# Patient Record
Sex: MALE | Race: WHITE | NOT HISPANIC OR LATINO | Employment: OTHER | URBAN - METROPOLITAN AREA
[De-identification: names, ages, dates, MRNs, and addresses within clinical notes are randomized per-mention and may not be internally consistent; named-entity substitution may affect disease eponyms.]

---

## 2017-04-12 ENCOUNTER — TELEPHONE (OUTPATIENT)
Dept: MEDICAL GROUP | Facility: MEDICAL CENTER | Age: 77
End: 2017-04-12

## 2017-04-27 ENCOUNTER — HOSPITAL ENCOUNTER (OUTPATIENT)
Dept: LAB | Facility: MEDICAL CENTER | Age: 77
End: 2017-04-27
Attending: INTERNAL MEDICINE
Payer: MEDICARE

## 2017-04-27 ENCOUNTER — TELEPHONE (OUTPATIENT)
Dept: MEDICAL GROUP | Facility: MEDICAL CENTER | Age: 77
End: 2017-04-27

## 2017-04-27 DIAGNOSIS — R73.01 IFG (IMPAIRED FASTING GLUCOSE): ICD-10-CM

## 2017-04-27 DIAGNOSIS — R97.20 ELEVATED PSA: ICD-10-CM

## 2017-04-27 DIAGNOSIS — E78.1 HYPERTRIGLYCERIDEMIA: Chronic | ICD-10-CM

## 2017-04-27 DIAGNOSIS — Z12.5 ENCOUNTER FOR SCREENING FOR MALIGNANT NEOPLASM OF PROSTATE: ICD-10-CM

## 2017-04-27 DIAGNOSIS — D75.89 MACROCYTOSIS WITHOUT ANEMIA: ICD-10-CM

## 2017-04-27 LAB
ALBUMIN SERPL BCP-MCNC: 4.1 G/DL (ref 3.2–4.9)
ALBUMIN/GLOB SERPL: 1.1 G/DL
ALP SERPL-CCNC: 64 U/L (ref 30–99)
ALT SERPL-CCNC: 16 U/L (ref 2–50)
ANION GAP SERPL CALC-SCNC: 8 MMOL/L (ref 0–11.9)
AST SERPL-CCNC: 20 U/L (ref 12–45)
BASOPHILS # BLD AUTO: 1.9 % (ref 0–1.8)
BASOPHILS # BLD: 0.12 K/UL (ref 0–0.12)
BILIRUB SERPL-MCNC: 0.6 MG/DL (ref 0.1–1.5)
BUN SERPL-MCNC: 24 MG/DL (ref 8–22)
CALCIUM SERPL-MCNC: 9.2 MG/DL (ref 8.5–10.5)
CHLORIDE SERPL-SCNC: 106 MMOL/L (ref 96–112)
CHOLEST SERPL-MCNC: 118 MG/DL (ref 100–199)
CO2 SERPL-SCNC: 22 MMOL/L (ref 20–33)
CREAT SERPL-MCNC: 0.97 MG/DL (ref 0.5–1.4)
CREAT UR-MCNC: 157.9 MG/DL
EOSINOPHIL # BLD AUTO: 0.26 K/UL (ref 0–0.51)
EOSINOPHIL NFR BLD: 4.1 % (ref 0–6.9)
ERYTHROCYTE [DISTWIDTH] IN BLOOD BY AUTOMATED COUNT: 51.4 FL (ref 35.9–50)
EST. AVERAGE GLUCOSE BLD GHB EST-MCNC: 123 MG/DL
GFR SERPL CREATININE-BSD FRML MDRD: >60 ML/MIN/1.73 M 2
GLOBULIN SER CALC-MCNC: 3.7 G/DL (ref 1.9–3.5)
GLUCOSE SERPL-MCNC: 104 MG/DL (ref 65–99)
HBA1C MFR BLD: 5.9 % (ref 0–5.6)
HCT VFR BLD AUTO: 48.2 % (ref 42–52)
HDLC SERPL-MCNC: 37 MG/DL
HGB BLD-MCNC: 15.9 G/DL (ref 14–18)
IMM GRANULOCYTES # BLD AUTO: 0.07 K/UL (ref 0–0.11)
IMM GRANULOCYTES NFR BLD AUTO: 1.1 % (ref 0–0.9)
LDLC SERPL CALC-MCNC: 54 MG/DL
LYMPHOCYTES # BLD AUTO: 1.9 K/UL (ref 1–4.8)
LYMPHOCYTES NFR BLD: 29.7 % (ref 22–41)
MCH RBC QN AUTO: 33.3 PG (ref 27–33)
MCHC RBC AUTO-ENTMCNC: 33 G/DL (ref 33.7–35.3)
MCV RBC AUTO: 101 FL (ref 81.4–97.8)
MICROALBUMIN UR-MCNC: 11.7 MG/DL
MICROALBUMIN/CREAT UR: 74 MG/G (ref 0–30)
MONOCYTES # BLD AUTO: 0.57 K/UL (ref 0–0.85)
MONOCYTES NFR BLD AUTO: 8.9 % (ref 0–13.4)
NEUTROPHILS # BLD AUTO: 3.48 K/UL (ref 1.82–7.42)
NEUTROPHILS NFR BLD: 54.3 % (ref 44–72)
NRBC # BLD AUTO: 0 K/UL
NRBC BLD AUTO-RTO: 0 /100 WBC
PLATELET # BLD AUTO: 368 K/UL (ref 164–446)
PMV BLD AUTO: 8.5 FL (ref 9–12.9)
POTASSIUM SERPL-SCNC: 4.4 MMOL/L (ref 3.6–5.5)
PROT SERPL-MCNC: 7.8 G/DL (ref 6–8.2)
RBC # BLD AUTO: 4.77 M/UL (ref 4.7–6.1)
SODIUM SERPL-SCNC: 136 MMOL/L (ref 135–145)
TRIGL SERPL-MCNC: 136 MG/DL (ref 0–149)
WBC # BLD AUTO: 6.4 K/UL (ref 4.8–10.8)

## 2017-04-27 PROCEDURE — 82570 ASSAY OF URINE CREATININE: CPT

## 2017-04-27 PROCEDURE — 85025 COMPLETE CBC W/AUTO DIFF WBC: CPT

## 2017-04-27 PROCEDURE — 80053 COMPREHEN METABOLIC PANEL: CPT

## 2017-04-27 PROCEDURE — 36415 COLL VENOUS BLD VENIPUNCTURE: CPT

## 2017-04-27 PROCEDURE — 83036 HEMOGLOBIN GLYCOSYLATED A1C: CPT | Mod: GA

## 2017-04-27 PROCEDURE — 82043 UR ALBUMIN QUANTITATIVE: CPT

## 2017-04-27 PROCEDURE — 84153 ASSAY OF PSA TOTAL: CPT

## 2017-04-27 PROCEDURE — 80061 LIPID PANEL: CPT

## 2017-04-28 LAB — PSA SERPL-MCNC: 3.05 NG/ML (ref 0–4)

## 2017-05-01 ENCOUNTER — TELEPHONE (OUTPATIENT)
Dept: MEDICAL GROUP | Facility: MEDICAL CENTER | Age: 77
End: 2017-05-01

## 2017-05-01 NOTE — TELEPHONE ENCOUNTER
----- Message from Ken Jordan M.D. sent at 4/30/2017  8:00 PM PDT -----  Please, notify the patient that labs are reviewed, and will be discussed at OV, thanks, Dr Steiner

## 2017-05-08 ENCOUNTER — OFFICE VISIT (OUTPATIENT)
Dept: MEDICAL GROUP | Facility: MEDICAL CENTER | Age: 77
End: 2017-05-08
Payer: MEDICARE

## 2017-05-08 VITALS
SYSTOLIC BLOOD PRESSURE: 118 MMHG | OXYGEN SATURATION: 93 % | DIASTOLIC BLOOD PRESSURE: 70 MMHG | BODY MASS INDEX: 29.01 KG/M2 | HEART RATE: 67 BPM | RESPIRATION RATE: 14 BRPM | WEIGHT: 202.6 LBS | TEMPERATURE: 98.8 F | HEIGHT: 70 IN

## 2017-05-08 DIAGNOSIS — N40.1 BENIGN NODULAR PROSTATIC HYPERPLASIA WITH LOWER URINARY TRACT SYMPTOMS: ICD-10-CM

## 2017-05-08 DIAGNOSIS — E78.1 HYPERTRIGLYCERIDEMIA: Chronic | ICD-10-CM

## 2017-05-08 DIAGNOSIS — Z00.00 HEALTH CARE MAINTENANCE: ICD-10-CM

## 2017-05-08 DIAGNOSIS — I10 ESSENTIAL HYPERTENSION: ICD-10-CM

## 2017-05-08 DIAGNOSIS — G60.8 PERIPHERAL SENSORY-MOTOR AXONAL POLYNEUROPATHY: ICD-10-CM

## 2017-05-08 DIAGNOSIS — R73.01 IFG (IMPAIRED FASTING GLUCOSE): ICD-10-CM

## 2017-05-08 DIAGNOSIS — D75.89 MACROCYTOSIS WITHOUT ANEMIA: ICD-10-CM

## 2017-05-08 PROCEDURE — 99214 OFFICE O/P EST MOD 30 MIN: CPT | Performed by: INTERNAL MEDICINE

## 2017-05-08 RX ORDER — TAMSULOSIN HYDROCHLORIDE 0.4 MG/1
0.4 CAPSULE ORAL
Qty: 90 CAP | Refills: 0 | Status: SHIPPED | OUTPATIENT
Start: 2017-05-08 | End: 2017-07-10 | Stop reason: SDUPTHER

## 2017-05-08 RX ORDER — VERAPAMIL HYDROCHLORIDE 240 MG/1
240 TABLET, FILM COATED, EXTENDED RELEASE ORAL
Qty: 180 TAB | Refills: 3 | Status: SHIPPED | OUTPATIENT
Start: 2017-05-08

## 2017-05-08 NOTE — PROGRESS NOTES
CHIEF COMPLAINT  Chief Complaint   Patient presents with   • Follow-Up   HTN    HPI  Patient is a 76 y.o. male patient who presents today for the following     Hypertension, controlled  Meds: Verapamil, 240 mg QD, taking medication as prescribed.    He measuring BP at home, it has been < 125/85.  No headaches, vision problems, tinnitus.  No chest pain/pressure, palpitations, irregular heart beats, exertional dyspnea, peripheral edema.  Diet: regular, low carb  Low salt diet: no  Exercise: > 4 days a week; fitness / gym  BMI: 29  FH: neg    IFG, stable  He had elevated A1c of 5.9, previous 6.0.  Diet: lower carb diet.    Exercise: regular  BMI: 29  No polydipsia, polyphagia, polyuria.  No abdominal pain, weight loss, fatigue.  FH of DM: neg    Hypertriglyceridemia, controlled  Meds:  Gemfibrozil, 600 mg QD              - taking daily, as prescribed. No myalgias, muscle cramps or pain.    Diet / Exercise:  As above  BMI: Body mass index is 29 kg/(m^2).  FH:  Parents  Reviewed FLP, and below.    Macrocytosis / St post colectomy  Stable.  The patient had abnormal CBC, with elevated MCV,  improved.  He has been on oral Vitamin B12 and folate.      Peripheral neuropathy   Onset: ~ in 2009.    Complains of: dysesthesias and paresthesias of the feet  Course: Slowly, gradually worsening  He has been on vitamin B12 and folate supplements.  He was evaluated by neurology, Dr Dacosta, the last office visit falls on 2/10/16, the note was reviewed:  - Continue current treatment.    BPH  C/o  -  urinary urgency.  - nocturia: x 2  - dribbling  - low stream    Denies:   - incomplete emptying  - hesitancy    Meds; he was on finasteride, that worsened his frequency; improved when he d/c med.     Reviewed PMH, PSH, FH, SH, ALL, HCM/IMM, no changes  Reviewed MEDS, no changes    Patient Active Problem List    Diagnosis Date Noted   • Peripheral sensory-motor axonal polyneuropathy      Priority: High   • Decreased hearing of both ears  11/18/2014     Priority: Medium   • Macrocytosis without anemia 11/20/2012     Priority: Medium   • Hypertriglyceridemia 11/15/2011     Priority: Medium   • Status post colectomy 11/15/2011     Priority: Medium   • Elevated PSA 11/15/2011     Priority: Medium   • IFG (impaired fasting glucose) 11/14/2016   • Arcus senilis of both eyes 11/14/2016   • Uric acid nephrolithiasis 11/14/2016   • Health care maintenance 10/08/2015   • Essential hypertension 08/26/2015   • BPH (benign prostatic hyperplasia) 05/20/2015     CURRENT MEDICATIONS  Current Outpatient Prescriptions   Medication Sig Dispense Refill   • verapamil ER (CALAN-SR) 240 MG Tab CR Take 1 Tab by mouth every day. TAKE 1 TAB BY MOUTH EVERY DAY. 180 Tab 1   • allopurinol (ZYLOPRIM) 300 MG Tab Take 1 Tab by mouth every day. 90 Tab 1   • finasteride (PROSCAR) 5 MG Tab Take 1 Tab by mouth every day. TAKE 1 TAB BY MOUTH EVERY DAY. 90 Tab 1   • folic acid (FOLVITE) 1 MG Tab Take 1 Tab by mouth every day. TAKE 1 TAB BY MOUTH EVERY DAY. 90 Tab 1   • gemfibrozil (LOPID) 600 MG Tab Take 1 Tab by mouth every day. TAKE 1 TAB BY MOUTH EVERY DAY. 90 Tab 3   • cyanocobalamin (VITAMIN B12) 1000 MCG Tab Take 1 Tab by mouth every day. 90 Tab 3   • fluticasone (FLONASE) 50 MCG/ACT nasal spray Spray 1 Spray in nose every 12 hours. Each Nostril 3 Bottle 6   • Hydrocortisone Acetate (ANUSOL HC-1) 1 % OINT Apply daily 1 Tube 3   • aspirin 81 MG tablet Take 81 mg by mouth every day.       No current facility-administered medications for this visit.     ALLERGIES  Allergies: Review of patient's allergies indicates no known allergies.  PAST MEDICAL HISTORY  Past Medical History   Diagnosis Date   • Hypertension    • Hyperlipidemia    • Unspecified cataract    • Other inflammatory and toxic neuropathy    • Uric acid nephrolithiasis 2011   • Ulcerative colitis (CMS-HCC)      colectomy in 1990     SURGICAL HISTORY  He  has past surgical history that includes colon resection (1990) and  "abdominal exploration.  SOCIAL HISTORY  Social History   Substance Use Topics   • Smoking status: Never Smoker    • Smokeless tobacco: Never Used   • Alcohol Use: 0.0 oz/week     0 Standard drinks or equivalent per week      Comment: occ     Social History     Social History Narrative     FAMILY HISTORY  Family History   Problem Relation Age of Onset   • Cancer Sister    • Hyperlipidemia Sister    • Hyperlipidemia Mother    • Hyperlipidemia Father    • Hypertension Neg Hx    • Heart Disease Neg Hx    • Diabetes Neg Hx    • Stroke Neg Hx      Family Status   Relation Status Death Age   • Sister     • Mother     • Father       ROS   Constitutional: Negative for fever, chills.  HENT: Negative for congestion, sore throat.  Eyes: Negative for blurred vision.   Respiratory: Negative for cough, shortness of breath.  Cardiovascular: Negative for chest pain, palpitations. And per HPI.  Gastrointestinal: Negative for heartburn, nausea, abdominal pain.   Genitourinary: Negative for dysuria. And per HPI.  Musculoskeletal: Negative for significant myalgias, back pain and joint pain.   Skin: Negative for rash and itching.   Neuro: Negative for dizziness, weakness and headaches. And per HPI.  Endo/Heme/Allergies: Does not bruise/bleed easily. And per HPI.  Psychiatric/Behavioral: Negative for depression, anxiety    PHYSICAL EXAM   Blood pressure 118/70, pulse 67, temperature 37.1 °C (98.7 °F), height 1.778 m (5' 10\"), weight 91.9 kg (202 lb 9.6 oz), SpO2 93 %. Body mass index is 29.07 kg/(m^2).  General:  NAD, well appearing  HEENT:   NC/AT, PERRLA, EOMI, TMs are clear. Oropharyngeal mucosa is pink,  without lesions;  no cervical / supraclavicular  lymphadenopathy, no thyromegaly.    Cardiovascular: RRR.   No m/r/g. No carotid bruits .      Lungs:   CTAB, no w/r/r, no respiratory distress.  Abdomen: Soft, NT/ND + BS; no suprapubic tenderness; no masses or hepatosplenomegaly.  Extremities:  2+ DP and radial " pulses bilaterally.  No c/c/e.   Skin:  Warm, dry.  No erythema. No rash.   Neurologic: Alert & oriented x 3. CN II-XII grossly intact. Brachioradialis / knee DTR are 2/4, symmetric. Strength and sensation grossly intact.  No focal deficits.  Psychiatric:  Affect normal, mood normal, judgment normal.    LABS     Labs are reviewed and discussed with a patient    Lab Results   Component Value Date/Time    CHOLESTEROL, 04/27/2017 09:03 AM    LDL 54 04/27/2017 09:03 AM    HDL 37* 04/27/2017 09:03 AM    TRIGLYCERIDES 136 04/27/2017 09:03 AM       Lab Results   Component Value Date/Time    SODIUM 136 04/27/2017 09:03 AM    POTASSIUM 4.4 04/27/2017 09:03 AM    CHLORIDE 106 04/27/2017 09:03 AM    CO2 22 04/27/2017 09:03 AM    GLUCOSE 104* 04/27/2017 09:03 AM    BUN 24* 04/27/2017 09:03 AM    CREATININE 0.97 04/27/2017 09:03 AM     Lab Results   Component Value Date/Time    ALKALINE PHOSPHATASE 64 04/27/2017 09:03 AM    AST(SGOT) 20 04/27/2017 09:03 AM    ALT(SGPT) 16 04/27/2017 09:03 AM    TOTAL BILIRUBIN 0.6 04/27/2017 09:03 AM      Lab Results   Component Value Date/Time    GLYCOHEMOGLOBIN 5.9* 04/27/2017 09:03 AM    GLYCOHEMOGLOBIN 6.0* 11/02/2016 08:51 AM    GLYCOHEMOGLOBIN 5.8* 01/07/2015 09:26 AM     No results found for: TSH  No results found for: FREET4    Lab Results   Component Value Date/Time    WBC 6.4 04/27/2017 09:03 AM    RBC 4.77 04/27/2017 09:03 AM    HEMOGLOBIN 15.9 04/27/2017 09:03 AM    HEMATOCRIT 48.2 04/27/2017 09:03 AM    .0* 04/27/2017 09:03 AM    MCH 33.3* 04/27/2017 09:03 AM    MCHC 33.0* 04/27/2017 09:03 AM    MPV 8.5* 04/27/2017 09:03 AM    NEUTROPHILS-POLYS 54.30 04/27/2017 09:03 AM    LYMPHOCYTES 29.70 04/27/2017 09:03 AM    MONOCYTES 8.90 04/27/2017 09:03 AM    EOSINOPHILS 4.10 04/27/2017 09:03 AM    BASOPHILS 1.90* 04/27/2017 09:03 AM      IMAGING     None    ASSESMENT AND PLAN        1. Essential hypertension  Controlled, continue current treatment  - verapamil ER (CALAN-SR)  240 MG Tab CR; Take 1 Tab by mouth every day. TAKE 1 TAB BY MOUTH EVERY DAY.  Dispense: 180 Tab; Refill: 3  - BASIC METABOLIC PANEL; Future    2. IFG (impaired fasting glucose)  Stable, controlled with lifestyle modification;   - Continue low fat/low carb diet, daily exercise  - HEMOGLOBIN A1C; Future  - BASIC METABOLIC PANEL; Future    3. Hypertriglyceridemia  Controlled, continue current dose of gemfibrozil    4. Macrocytosis without anemia  Stable, continue vitamin D supplement    5. Peripheral sensory-motor axonal polyneuropathy  Stable, no medications, will be followed    6. Benign nodular prostatic hyperplasia with lower urinary tract symptoms  Trial:  - tamsulosin (FLOMAX) 0.4 MG capsule; Take 1 Cap by mouth ONE-HALF HOUR AFTER BREAKFAST.  Dispense: 90 Cap; Refill: 0    7. Health care maintenance  Advised shingles shot.    Counseling:   - Smoking:  Nonsmoker    Followup: in 4 weeks - SCP/PE    All questions are answered.    Please note that this dictation was created using voice recognition software, and that there might be errors of oli and possibly content.

## 2017-05-08 NOTE — MR AVS SNAPSHOT
"        [place]   2017 11:40 AM   Office Visit   MRN: [id number]    Department:  [facility]   Dept Phone:  [phone]    Description:  Male : 1940   Provider:  Ken Jordan M.D.           Reason for Visit     Follow-Up           Allergies as of 2017     No Known Allergies      You were diagnosed with     Essential hypertension   [7202371]       IFG (impaired fasting glucose)   [177224]       Hypertriglyceridemia   [829232]       Macrocytosis without anemia   [422723]       Peripheral sensory-motor axonal polyneuropathy   [890177]       Benign nodular prostatic hyperplasia with lower urinary tract symptoms   [7540024]       Health care maintenance   [593608]         Vital Signs     Blood Pressure Pulse Temperature Respirations Height Weight    118/70 mmHg 67 37.1 °C (98.8 °F) 14 1.778 m (5' 10\") 91.9 kg (202 lb 9.6 oz)    Body Mass Index Oxygen Saturation Smoking Status             29.07 kg/m2 93% Never Smoker          Basic Information     Date Of Birth Sex Race Ethnicity Preferred Language    1940 Male White Unknown English      Your appointments     2017  9:00 AM   ANNUAL WELLNESS with Ken Jordan M.D., [place]    [facility]    [address]  [address]  [facility] [address]   [phone]              Problem List              ICD-10-CM Priority Class Noted - Resolved    Hypertriglyceridemia (Chronic) E78.1 Medium  11/15/2011 - Present    Status post colectomy (Chronic) Z90.49 Medium  11/15/2011 - Present    Macrocytosis without anemia D75.89 Medium  2012 - Present    Decreased hearing of both ears H91.93 Medium  2014 - Present    Peripheral sensory-motor axonal polyneuropathy G60.8 High  Unknown - Present    BPH (benign prostatic hyperplasia) N40.0   2015 - Present    Essential hypertension I10   2015 - Present    Health care maintenance Z00.00   10/8/2015 - " Present    IFG (impaired fasting glucose) R73.01   11/14/2016 - Present    Arcus senilis of both eyes H18.413   11/14/2016 - Present    Uric acid nephrolithiasis N20.0   11/14/2016 - Present      Health Maintenance        Date Due Completion Dates    IMM ZOSTER VACCINE 6/15/1999 ---    IMM DTaP/Tdap/Td Vaccine (2 - Td) 5/14/2023 5/14/2013            Current Immunizations     13-VALENT PCV PREVNAR 11/14/2016 12:06 PM    INFLUENZA VACCINE H1N1 2/9/2010    Influenza TIV (IM) 11/20/2012  9:44 AM    Influenza Vaccine Adult HD 11/23/2016, 10/8/2015, 11/18/2014, 11/12/2013    Pneumococcal polysaccharide vaccine (PPSV-23) 11/12/2013    Tdap Vaccine 5/14/2013      Below and/or attached are the medications your provider expects you to take. Review all of your home medications and newly ordered medications with your provider and/or pharmacist. Follow medication instructions as directed by your provider and/or pharmacist. Please keep your medication list with you and share with your provider. Update the information when medications are discontinued, doses are changed, or new medications (including over-the-counter products) are added; and carry medication information at all times in the event of emergency situations     Allergies:  No Known Allergies          Medications  Valid as of: May 08, 2017 - 11:57 AM    Generic Name Brand Name Tablet Size Instructions for use    Allopurinol (Tab) ZYLOPRIM 300 MG Take 1 Tab by mouth every day.        Aspirin (Tab) aspirin 81 MG Take 81 mg by mouth every day.        Cyanocobalamin (Tab) VITAMIN B12 1000 MCG Take 1 Tab by mouth every day.        Fluticasone Propionate (Suspension) FLONASE 50 MCG/ACT Spray 1 Spray in nose every 12 hours. Each Nostril        Folic Acid (Tab) FOLVITE 1 MG Take 1 Tab by mouth every day. TAKE 1 TAB BY MOUTH EVERY DAY.        Gemfibrozil (Tab) LOPID 600 MG Take 1 Tab by mouth every day. TAKE 1 TAB BY MOUTH EVERY DAY.        Hydrocortisone Acetate (Ointment)  Hydrocortisone Acetate 1 % Apply daily        Tamsulosin HCl (Cap) FLOMAX 0.4 MG Take 1 Cap by mouth ONE-HALF HOUR AFTER BREAKFAST.        Verapamil HCl (Tab CR) CALAN- MG Take 1 Tab by mouth every day. TAKE 1 TAB BY MOUTH EVERY DAY.        .                 Medicines prescribed today were sent to:     [id number] - [place], [place] - [address]    [address] [place] [address]    Phone: [phone] Fax: [phone]    Open 24 Hours?: No      Medication refill instructions:       If your prescription bottle indicates you have medication refills left, it is not necessary to call your provider’s office. Please contact your pharmacy and they will refill your medication.    If your prescription bottle indicates you do not have any refills left, you may request refills at any time through one of the following ways: The online DSTLD system (except Urgent Care), by calling your provider’s office, or by asking your pharmacy to contact your provider’s office with a refill request. Medication refills are processed only during regular business hours and may not be available until the next business day. Your provider may request additional information or to have a follow-up visit with you prior to refilling your medication.   *Please Note: Medication refills are assigned a new Rx number when refilled electronically. Your pharmacy may indicate that no refills were authorized even though a new prescription for the same medication is available at the pharmacy. Please request the medicine by name with the pharmacy before contacting your provider for a refill.        Your To Do List     Future Labs/Procedures Complete By Expires    BASIC METABOLIC PANEL  As directed 5/9/2018    HEMOGLOBIN A1C  As directed 5/9/2018         DSTLD Access Code: [email or web address]  Expires: 6/7/2017 11:57 AM    DSTLD  A secure, online tool to manage your health information     [email or web address]’s DSTLD® is a secure, online tool  that connects you to your personalized health information from the privacy of your home -- day or night - making it very easy for you to manage your healthcare. Once the activation process is completed, you can even access your medical information using the LawBite carlotta, which is available for free in the Apple Carlotta store or Google Play store.     LawBite provides the following levels of access (as shown below):   My Chart Features   [facility] Doctor [facility]  Specialists [facility]  Urgent  Care Non-[facility]  Primary Care  Doctor   Email your healthcare team securely and privately 24/7 X X X    Manage appointments: schedule your next appointment; view details of past/upcoming appointments X      Request prescription refills. X      View recent personal medical records, including lab and immunizations X X X X   View health record, including health history, allergies, medications X X X X   Read reports about your outpatient visits, procedures, consult and ER notes X X X X   See your discharge summary, which is a recap of your hospital and/or ER visit that includes your diagnosis, lab results, and care plan. X X       How to register for LawBite:  1. Go to  [email or web address].  2. Click on the Sign Up Now box, which takes you to the New Member Sign Up page. You will need to provide the following information:  a. Enter your LawBite Access Code exactly as it appears at the top of this page. (You will not need to use this code after you’ve completed the sign-up process. If you do not sign up before the expiration date, you must request a new code.)   b. Enter your date of birth.   c. Enter your home email address.   d. Click Submit, and follow the next screen’s instructions.  3. Create a LawBite ID. This will be your LawBite login ID and cannot be changed, so think of one that is secure and easy to remember.  4. Create a LawBite password. You can change your password at any time.  5. Enter your Password Reset  Question and Answer. This can be used at a later time if you forget your password.   6. Enter your e-mail address. This allows you to receive e-mail notifications when new information is available in Triposo.  7. Click Sign Up. You can now view your health information.    For assistance activating your Triposo account, call [phone]

## 2017-05-18 RX ORDER — FOLIC ACID 1 MG/1
TABLET ORAL
Qty: 90 TAB | Refills: 0 | Status: SHIPPED | OUTPATIENT
Start: 2017-05-18 | End: 2017-08-18 | Stop reason: SDUPTHER

## 2017-05-18 NOTE — TELEPHONE ENCOUNTER
Was the patient seen in the last year in this department? Yes     Does patient have an active prescription for medications requested? No     Received Request Via: Pharmacy     Last Visit: 5/8/17    Last Labs:4/27/17

## 2017-05-22 RX ORDER — ALLOPURINOL 300 MG/1
TABLET ORAL
Qty: 90 TAB | Refills: 1 | Status: SHIPPED | OUTPATIENT
Start: 2017-05-22 | End: 2017-11-18 | Stop reason: SDUPTHER

## 2017-06-01 ENCOUNTER — TELEPHONE (OUTPATIENT)
Dept: MEDICAL GROUP | Facility: MEDICAL CENTER | Age: 77
End: 2017-06-01

## 2017-06-01 NOTE — TELEPHONE ENCOUNTER
ANNUAL WELLNESS VISIT PRE-VISIT PLANNING     1.  Reviewed last PCP office visit assessment and plan notes: Yes 05/08/2017    2.  If any orders were placed last visit do we have Results/Consult Notes?        •  Labs? No, labs still pending        •  Imaging? No        •  Referrals? No     3.  Patient Care Coordination Note was updated with diagnosis information:  No    4.  Patient is due for these Health Maintenance Topics:   Health Maintenance Due   Topic Date Due   • IMM ZOSTER VACCINE  06/15/1999         5.  Immunizations were updated in Send Word Now using WebIZ?: Yes       •  Is patient due for Shingles? Yes.  If yes, was patient alerted of copay? Yes    6.  Patient has:       No chronic disease     7.  Updated Care Team with [facility] and all specialists?        •   Gait devices, O2, CPAP, etc: N\A        •   Eye professional: N\A       •   Other specialists (GYN, cardiology, endo, etc): N\A    8.  Is patient in need of any refills prior to office visit? No       •    Separate refill encounter created?: no    9.  Patient was informed to arrive 15 min prior to their scheduled appointment and bring in their medication bottles? yes    10.  Patient was advised: “This is a free wellness visit. The provider will screen for medical conditions to help you stay healthy. If you have other concerns to address you may be asked to discuss these at a separate visit or there may be an additional fee.”  Yes

## 2017-06-02 ENCOUNTER — HOSPITAL ENCOUNTER (OUTPATIENT)
Dept: LAB | Facility: MEDICAL CENTER | Age: 77
End: 2017-06-02
Attending: INTERNAL MEDICINE
Payer: MEDICARE

## 2017-06-02 DIAGNOSIS — R73.01 IFG (IMPAIRED FASTING GLUCOSE): ICD-10-CM

## 2017-06-02 DIAGNOSIS — I10 ESSENTIAL HYPERTENSION: ICD-10-CM

## 2017-06-02 LAB
ANION GAP SERPL CALC-SCNC: 10 MMOL/L (ref 0–11.9)
BUN SERPL-MCNC: 25 MG/DL (ref 8–22)
CALCIUM SERPL-MCNC: 9.3 MG/DL (ref 8.5–10.5)
CHLORIDE SERPL-SCNC: 106 MMOL/L (ref 96–112)
CO2 SERPL-SCNC: 22 MMOL/L (ref 20–33)
CREAT SERPL-MCNC: 1.11 MG/DL (ref 0.5–1.4)
EST. AVERAGE GLUCOSE BLD GHB EST-MCNC: 128 MG/DL
GFR SERPL CREATININE-BSD FRML MDRD: >60 ML/MIN/1.73 M 2
GLUCOSE SERPL-MCNC: 111 MG/DL (ref 65–99)
HBA1C MFR BLD: 6.1 % (ref 0–5.6)
POTASSIUM SERPL-SCNC: 4.4 MMOL/L (ref 3.6–5.5)
SODIUM SERPL-SCNC: 138 MMOL/L (ref 135–145)

## 2017-06-02 PROCEDURE — 80048 BASIC METABOLIC PNL TOTAL CA: CPT

## 2017-06-02 PROCEDURE — 83036 HEMOGLOBIN GLYCOSYLATED A1C: CPT

## 2017-06-02 PROCEDURE — 36415 COLL VENOUS BLD VENIPUNCTURE: CPT

## 2017-06-08 ENCOUNTER — OFFICE VISIT (OUTPATIENT)
Dept: MEDICAL GROUP | Facility: MEDICAL CENTER | Age: 77
End: 2017-06-08
Payer: MEDICARE

## 2017-06-08 VITALS
BODY MASS INDEX: 29.12 KG/M2 | WEIGHT: 203.4 LBS | DIASTOLIC BLOOD PRESSURE: 66 MMHG | TEMPERATURE: 98.3 F | SYSTOLIC BLOOD PRESSURE: 120 MMHG | OXYGEN SATURATION: 94 % | RESPIRATION RATE: 14 BRPM | HEART RATE: 60 BPM | HEIGHT: 70 IN

## 2017-06-08 DIAGNOSIS — E78.1 HYPERTRIGLYCERIDEMIA: Chronic | ICD-10-CM

## 2017-06-08 DIAGNOSIS — D75.89 MACROCYTOSIS WITHOUT ANEMIA: ICD-10-CM

## 2017-06-08 DIAGNOSIS — Z00.00 HEALTH CARE MAINTENANCE: ICD-10-CM

## 2017-06-08 DIAGNOSIS — H18.413 ARCUS SENILIS, BILATERAL: ICD-10-CM

## 2017-06-08 DIAGNOSIS — R73.01 IFG (IMPAIRED FASTING GLUCOSE): ICD-10-CM

## 2017-06-08 DIAGNOSIS — G60.8 PERIPHERAL SENSORY-MOTOR AXONAL POLYNEUROPATHY: ICD-10-CM

## 2017-06-08 DIAGNOSIS — N40.0 BENIGN NODULAR PROSTATIC HYPERPLASIA WITHOUT LOWER URINARY TRACT SYMPTOMS: ICD-10-CM

## 2017-06-08 DIAGNOSIS — I10 ESSENTIAL HYPERTENSION: ICD-10-CM

## 2017-06-08 DIAGNOSIS — N20.0 URIC ACID NEPHROLITHIASIS: ICD-10-CM

## 2017-06-08 DIAGNOSIS — Z00.00 MEDICARE ANNUAL WELLNESS VISIT, SUBSEQUENT: ICD-10-CM

## 2017-06-08 DIAGNOSIS — Z90.49 STATUS POST TOTAL COLECTOMY: ICD-10-CM

## 2017-06-08 DIAGNOSIS — H91.93 DECREASED HEARING OF BOTH EARS: ICD-10-CM

## 2017-06-08 DIAGNOSIS — L82.1 SEBORRHEIC KERATOSIS: ICD-10-CM

## 2017-06-08 PROBLEM — H18.419 ARCUS SENILIS: Status: ACTIVE | Noted: 2017-06-08

## 2017-06-08 PROCEDURE — G0439 PPPS, SUBSEQ VISIT: HCPCS | Performed by: INTERNAL MEDICINE

## 2017-06-08 ASSESSMENT — PAIN SCALES - GENERAL: PAINLEVEL: NO PAIN

## 2017-06-08 ASSESSMENT — PATIENT HEALTH QUESTIONNAIRE - PHQ9: CLINICAL INTERPRETATION OF PHQ2 SCORE: 0

## 2017-06-08 NOTE — PROGRESS NOTES
Chief Complaint   Patient presents with   • Annual Wellness Visit     HPI:  SHASTA Nick is a 76 y.o. male here for Medicare Annual Wellness Visit    Patient Active Problem List    Diagnosis Date Noted   • Peripheral sensory-motor axonal polyneuropathy      Priority: High   • Decreased hearing of both ears 11/18/2014     Priority: Medium   • Macrocytosis without anemia 11/20/2012     Priority: Medium   • Hypertriglyceridemia 11/15/2011     Priority: Medium   • Status post colectomy 11/15/2011     Priority: Medium   • IFG (impaired fasting glucose) 11/14/2016   • Arcus senilis of both eyes 11/14/2016   • Uric acid nephrolithiasis 11/14/2016   • Health care maintenance 10/08/2015   • Essential hypertension 08/26/2015   • BPH (benign prostatic hyperplasia) 05/20/2015     Current Outpatient Prescriptions   Medication Sig Dispense Refill   • Glucosamine HCl (GLUCOSAMINE PO) Take  by mouth.     • allopurinol (ZYLOPRIM) 300 MG Tab TAKE 1 TAB BY MOUTH EVERY DAY. 90 Tab 1   • folic acid (FOLVITE) 1 MG Tab TAKE 1 TAB BY MOUTH EVERY DAY. 90 Tab 0   • verapamil ER (CALAN-SR) 240 MG Tab CR Take 1 Tab by mouth every day. TAKE 1 TAB BY MOUTH EVERY DAY. 180 Tab 3   • tamsulosin (FLOMAX) 0.4 MG capsule Take 1 Cap by mouth ONE-HALF HOUR AFTER BREAKFAST. 90 Cap 0   • gemfibrozil (LOPID) 600 MG Tab Take 1 Tab by mouth every day. TAKE 1 TAB BY MOUTH EVERY DAY. 90 Tab 3   • cyanocobalamin (VITAMIN B12) 1000 MCG Tab Take 1 Tab by mouth every day. 90 Tab 3   • aspirin 81 MG tablet Take 81 mg by mouth every day.     • fluticasone (FLONASE) 50 MCG/ACT nasal spray Spray 1 Spray in nose every 12 hours. Each Nostril 3 Bottle 6   • Hydrocortisone Acetate (ANUSOL HC-1) 1 % OINT Apply daily 1 Tube 3     No current facility-administered medications for this visit.      Patient is taking medications as noted in medication list.  Current supplements as per medication list.   Chronic narcotic pain medicines: no    Allergies: Review of patient's  allergies indicates no known allergies.    Current social contact/activities: Patient is with different organizations, volunteer at [employer]       Is patient current with immunizations?  No, due for ZOSTAVAX (Shingles). Patient is interested in receiving NONE today. Patient given rx in past vaccine is to expensive.    He  reports that he has never smoked. He has never used smokeless tobacco. He reports that he drinks alcohol. He reports that he does not use illicit drugs.  Counseling given: Not Answered    DPA/Advanced Directive:  Patient has TRUST informed to bring in copy.   ROS:    Gait: Uses a cane as needed.   Ostomy: no   Other tubes: no   Amputations: no   Chronic oxygen use: no   Last eye exam: last Fall 09/2016   Wears hearing aids: yes   : Denies incontinence.     Depression Screening  Little interest or pleasure in doing things?  0 - not at all  Feeling down, depressed, or hopeless?  0 - not at all  Patient Health Questionnaire Score: 0  If depressive symptoms identified deferred to follow up visit unless specifically addressed in assessment and plan.    Screening for Cognitive Impairment  Three Minute Recall (banana, sunrise, fence)  2/3    Draw clock face with all 12 numbers set to the hand to show 10 minutes past 11 o'clock  1 5/5  If cognitive concerns identified deferred to follow up visit unless specifically addressed in assessment and plan.    Fall Risk Assessment  Has the patient had two or more falls in the last year or any fall with injury in the last year?  Yes  If Fall Risk identified deferred to follow up visit unless specifically addressed in assessment and plan.    Safety Assessment  Throw rugs on floor.  No  Handrails on all stairs.  Yes  Good lighting in all hallways.  Yes  Difficulty hearing.  No  Patient counseled about all safety risks that were identified.    Functional Assessment ADLs  Are there any barriers preventing you from cooking for yourself or meeting nutritional  "needs?  No.    Are there any barriers preventing you from driving safely or obtaining transportation?  No.    Are there any barriers preventing you from using a telephone or calling for help?  No.    Are there any barriers preventing you from shopping?  No.    Are there any barriers preventing you from taking care of your own finances?  No.    Are there any barriers preventing you from managing your medications?  No.    Are currently engaging any exercise or physical activity?  Yes.  Walk around the block.    Health Maintenance Summary                IMM ZOSTER VACCINE Overdue 6/15/1999     IMM DTaP/Tdap/Td Vaccine Next Due 5/14/2023      Done 5/14/2013 Imm Admin: Tdap Vaccine      Patient Care Team:  Ken Jordan M.D. as PCP - General (Family Medicine)  Melvin Quintero M.D. as Consulting Physician (Ophthalmology)  Divina El M.D. as Consulting Physician (Dermatology)    Social History   Substance Use Topics   • Smoking status: Never Smoker    • Smokeless tobacco: Never Used   • Alcohol Use: 0.0 oz/week     0 Standard drinks or equivalent per week      Comment: occ     Family History   Problem Relation Age of Onset   • Cancer Sister      Skin cancer    • Hyperlipidemia Sister    • Hyperlipidemia Mother    • Hyperlipidemia Father    • Hypertension Neg Hx    • Heart Disease Neg Hx    • Diabetes Neg Hx    • Stroke Neg Hx      He  has a past medical history of Hypertension; Hyperlipidemia; Unspecified cataract; Other inflammatory and toxic neuropathy; Uric acid nephrolithiasis (2011); and Ulcerative colitis (CMS-HCC).   Past Surgical History   Procedure Laterality Date   • Colon resection  1990     [facility]   • Abdominal exploration     • Other  1990     Kidney stones removed      Exam:   Blood pressure 120/66, pulse 60, temperature 36.8 °C (98.3 °F), resp. rate 14, height 1.778 m (5' 10\"), weight 92.262 kg (203 lb 6.4 oz), SpO2 94 %. Body mass index is 29.18 kg/(m^2).  Hearing good.    Dentition good  Alert, " oriented in no acute distress.  Eye contact is good, speech goal directed, affect calm    Labs  Reviewed and discussed:    Lab Results   Component Value Date/Time    CHOLESTEROL, 04/27/2017 09:03 AM    LDL 54 04/27/2017 09:03 AM    HDL 37* 04/27/2017 09:03 AM    TRIGLYCERIDES 136 04/27/2017 09:03 AM       Lab Results   Component Value Date/Time    SODIUM 138 06/02/2017 11:26 AM    POTASSIUM 4.4 06/02/2017 11:26 AM    CHLORIDE 106 06/02/2017 11:26 AM    CO2 22 06/02/2017 11:26 AM    GLUCOSE 111* 06/02/2017 11:26 AM    BUN 25* 06/02/2017 11:26 AM    CREATININE 1.11 06/02/2017 11:26 AM     Lab Results   Component Value Date/Time    ALKALINE PHOSPHATASE 64 04/27/2017 09:03 AM    AST(SGOT) 20 04/27/2017 09:03 AM    ALT(SGPT) 16 04/27/2017 09:03 AM    TOTAL BILIRUBIN 0.6 04/27/2017 09:03 AM      Lab Results   Component Value Date/Time    GLYCOHEMOGLOBIN 6.1* 06/02/2017 11:26 AM    GLYCOHEMOGLOBIN 5.9* 04/27/2017 09:03 AM    GLYCOHEMOGLOBIN 6.0* 11/02/2016 08:51 AM     No results found for: TSH  No results found for: FREET4    Lab Results   Component Value Date/Time    WBC 6.4 04/27/2017 09:03 AM    RBC 4.77 04/27/2017 09:03 AM    HEMOGLOBIN 15.9 04/27/2017 09:03 AM    HEMATOCRIT 48.2 04/27/2017 09:03 AM    .0* 04/27/2017 09:03 AM    MCH 33.3* 04/27/2017 09:03 AM    MCHC 33.0* 04/27/2017 09:03 AM    MPV 8.5* 04/27/2017 09:03 AM    NEUTROPHILS-POLYS 54.30 04/27/2017 09:03 AM    LYMPHOCYTES 29.70 04/27/2017 09:03 AM    MONOCYTES 8.90 04/27/2017 09:03 AM    EOSINOPHILS 4.10 04/27/2017 09:03 AM    BASOPHILS 1.90* 04/27/2017 09:03 AM      Assessment and Plan.     1. Medicare annual wellness visit, subsequent  Reviewed PMH, PSH, FH, SH, ALL, MEDS, HCM/IMM.   Advised healthy habits, diet, exercise.    2. Health care maintenance  Due shingles, did not get due to the expense    3. Status post total colectomy  Remote, due to colitis, no colonoscopy.    4. Peripheral sensory-motor axonal polyneuropathy  He was evaluated  by neurology, no etiology identified  - Patient identified as fall risk.  Appropriate orders and counseling given.    5. Hypertriglyceridemia  Controlled with gemfibrozil, 600 mg daily.  Reviewed lipid panel, as above    6. Macrocytosis without anemia  He is on vitamin B12 supplement, status post colectomy.    7. Decreased hearing of both ears  He has bilateral hearing aids.  Advised to continue follow-up by audiology    8. Benign nodular prostatic hyperplasia without lower urinary tract symptoms  Controlled with Flomax, 0.4 mg daily    9. Essential hypertension  Controlled with verapamil, 240 mg daily.  Advised to continue monitoring blood pressure at home    10. IFG (impaired fasting glucose)  Stable, advised to continue low carb diet, daily exercise, lose some weight    11. Uric acid nephrolithiasis  Remote, on allopurinol 300 mg daily    12. Arcus senilis, bilateral  Found on exam    13. Seborrheic keratosis  Multiple lesions over upper extremities.  Advised to avoid sun exposure and use sunscreen.    Services suggested: No services needed at this time  Health Care Screening recommendations as per orders if indicated.  Referrals offered: PT/OT/Nutrition counseling/Behavioral Health/Smoking cessation as per orders if indicated.    Discussion today about general wellness and lifestyle habits:    · Prevent falls and reduce trip hazards; Cautioned about securing or removing rugs.  · Have a working fire alarm and carbon monoxide detector;   · Engage in regular physical activity and social activities     Follow-up: in 3 months

## 2017-06-08 NOTE — MR AVS SNAPSHOT
"        SHASTA Lobo Nick   2017 9:00 AM   Office Visit   MRN: [id number]    Department:  [place] [address]   Dept Phone:  [phone]    Description:  Male : 1940   Provider:  Ken Jordan M.D.; [facility]            Reason for Visit     Annual Wellness Visit           Allergies as of 2017     No Known Allergies      You were diagnosed with     Medicare annual wellness visit, subsequent   [168512]       Health care maintenance   [607210]       Status post total colectomy   [0151868]       Status post colectomy   [652164]       Peripheral sensory-motor axonal polyneuropathy   [094977]       Hypertriglyceridemia   [215293]       Macrocytosis without anemia   [851804]       Decreased hearing of both ears   [2564924]       Benign nodular prostatic hyperplasia without lower urinary tract symptoms   [4735614]       Essential hypertension   [5176355]       IFG (impaired fasting glucose)   [244973]       Arcus senilis of both eyes   [865870]       Uric acid nephrolithiasis   [274.11.ICD-9-CM]       Arcus senilis, bilateral   [525873]       Seborrheic keratosis   [2589698]         Vital Signs     Blood Pressure Pulse Temperature Respirations Height Weight    120/66 mmHg 60 36.8 °C (98.3 °F) 14 1.778 m (5' 10\") 92.262 kg (203 lb 6.4 oz)    Body Mass Index Oxygen Saturation Smoking Status             29.18 kg/m2 94% Never Smoker          Basic Information     Date Of Birth Sex Race Ethnicity Preferred Language    1940 Male White Unknown English      Problem List              ICD-10-CM Priority Class Noted - Resolved    Hypertriglyceridemia (Chronic) E78.1 Medium  11/15/2011 - Present    Status post colectomy (Chronic) Z90.49 Medium  11/15/2011 - Present    Macrocytosis without anemia D75.89 Medium  2012 - Present    Decreased hearing of both ears H91.93 Medium  2014 - Present    Peripheral sensory-motor axonal polyneuropathy G60.8 High  Unknown - Present    BPH (benign " prostatic hyperplasia) N40.0   5/20/2015 - Present    Essential hypertension I10   8/26/2015 - Present    Health care maintenance Z00.00   10/8/2015 - Present    IFG (impaired fasting glucose) R73.01   11/14/2016 - Present    Arcus senilis of both eyes H18.413   11/14/2016 - Present    Arcus senilis H18.419   6/8/2017 - Present    Seborrheic keratosis L82.1   6/8/2017 - Present      Health Maintenance        Date Due Completion Dates    IMM ZOSTER VACCINE 6/15/1999 ---    IMM DTaP/Tdap/Td Vaccine (2 - Td) 5/14/2023 5/14/2013            Current Immunizations     13-VALENT PCV PREVNAR 11/14/2016 12:06 PM    INFLUENZA VACCINE H1N1 2/9/2010    Influenza TIV (IM) 11/20/2012  9:44 AM    Influenza Vaccine Adult HD 11/23/2016, 10/8/2015, 11/18/2014, 11/12/2013    Pneumococcal polysaccharide vaccine (PPSV-23) 11/12/2013    Tdap Vaccine 5/14/2013      Below and/or attached are the medications your provider expects you to take. Review all of your home medications and newly ordered medications with your provider and/or pharmacist. Follow medication instructions as directed by your provider and/or pharmacist. Please keep your medication list with you and share with your provider. Update the information when medications are discontinued, doses are changed, or new medications (including over-the-counter products) are added; and carry medication information at all times in the event of emergency situations     Allergies:  No Known Allergies          Medications  Valid as of: June 08, 2017 -  9:27 AM    Generic Name Brand Name Tablet Size Instructions for use    Allopurinol (Tab) ZYLOPRIM 300 MG TAKE 1 TAB BY MOUTH EVERY DAY.        Aspirin (Tab) aspirin 81 MG Take 81 mg by mouth every day.        Cyanocobalamin (Tab) VITAMIN B12 1000 MCG Take 1 Tab by mouth every day.        Folic Acid (Tab) FOLVITE 1 MG TAKE 1 TAB BY MOUTH EVERY DAY.        Gemfibrozil (Tab) LOPID 600 MG Take 1 Tab by mouth every day. TAKE 1 TAB BY MOUTH EVERY  DAY.        Glucosamine HCl   Take  by mouth.        Tamsulosin HCl (Cap) FLOMAX 0.4 MG Take 1 Cap by mouth ONE-HALF HOUR AFTER BREAKFAST.        Verapamil HCl (Tab CR) CALAN- MG Take 1 Tab by mouth every day. TAKE 1 TAB BY MOUTH EVERY DAY.        .                 Medicines prescribed today were sent to:     [facility]Y #[id number] - [place], [place] - [address]    [address] [place] [address]    Phone: [phone] Fax: [phone]    Open 24 Hours?: No      Medication refill instructions:       If your prescription bottle indicates you have medication refills left, it is not necessary to call your provider’s office. Please contact your pharmacy and they will refill your medication.    If your prescription bottle indicates you do not have any refills left, you may request refills at any time through one of the following ways: The online K[employer]er system (except Urgent Care), by calling your provider’s office, or by asking your pharmacy to contact your provider’s office with a refill request. Medication refills are processed only during regular business hours and may not be available until the next business day. Your provider may request additional information or to have a follow-up visit with you prior to refilling your medication.   *Please Note: Medication refills are assigned a new Rx number when refilled electronically. Your pharmacy may indicate that no refills were authorized even though a new prescription for the same medication is available at the pharmacy. Please request the medicine by name with the pharmacy before contacting your provider for a refill.           Kixer Access Code: Activation code not generated  Current Kixer Status: Active

## 2017-07-11 RX ORDER — TAMSULOSIN HYDROCHLORIDE 0.4 MG/1
CAPSULE ORAL
Qty: 90 CAP | Refills: 1 | Status: SHIPPED | OUTPATIENT
Start: 2017-07-11 | End: 2018-01-07 | Stop reason: SDUPTHER

## 2017-08-18 RX ORDER — FOLIC ACID 1 MG/1
TABLET ORAL
Qty: 90 TAB | Refills: 0 | Status: SHIPPED | OUTPATIENT
Start: 2017-08-18 | End: 2017-11-18 | Stop reason: SDUPTHER

## 2017-10-05 ENCOUNTER — HOSPITAL ENCOUNTER (OUTPATIENT)
Dept: LAB | Facility: MEDICAL CENTER | Age: 77
End: 2017-10-05
Attending: INTERNAL MEDICINE
Payer: MEDICARE

## 2017-10-05 DIAGNOSIS — D75.89 MACROCYTOSIS WITHOUT ANEMIA: ICD-10-CM

## 2017-10-05 DIAGNOSIS — E78.1 HYPERTRIGLYCERIDEMIA: Chronic | ICD-10-CM

## 2017-10-05 DIAGNOSIS — R73.01 IFG (IMPAIRED FASTING GLUCOSE): ICD-10-CM

## 2017-10-05 LAB
ALBUMIN SERPL BCP-MCNC: 4.1 G/DL (ref 3.2–4.9)
ALBUMIN/GLOB SERPL: 1.1 G/DL
ALP SERPL-CCNC: 57 U/L (ref 30–99)
ALT SERPL-CCNC: 11 U/L (ref 2–50)
ANION GAP SERPL CALC-SCNC: 10 MMOL/L (ref 0–11.9)
AST SERPL-CCNC: 18 U/L (ref 12–45)
BASOPHILS # BLD AUTO: 2.2 % (ref 0–1.8)
BASOPHILS # BLD: 0.17 K/UL (ref 0–0.12)
BILIRUB SERPL-MCNC: 0.7 MG/DL (ref 0.1–1.5)
BUN SERPL-MCNC: 22 MG/DL (ref 8–22)
CALCIUM SERPL-MCNC: 9.4 MG/DL (ref 8.5–10.5)
CHLORIDE SERPL-SCNC: 107 MMOL/L (ref 96–112)
CHOLEST SERPL-MCNC: 108 MG/DL (ref 100–199)
CO2 SERPL-SCNC: 20 MMOL/L (ref 20–33)
CREAT SERPL-MCNC: 1.07 MG/DL (ref 0.5–1.4)
EOSINOPHIL # BLD AUTO: 0.42 K/UL (ref 0–0.51)
EOSINOPHIL NFR BLD: 5.4 % (ref 0–6.9)
ERYTHROCYTE [DISTWIDTH] IN BLOOD BY AUTOMATED COUNT: 52.4 FL (ref 35.9–50)
EST. AVERAGE GLUCOSE BLD GHB EST-MCNC: 134 MG/DL
GFR SERPL CREATININE-BSD FRML MDRD: >60 ML/MIN/1.73 M 2
GLOBULIN SER CALC-MCNC: 3.7 G/DL (ref 1.9–3.5)
GLUCOSE SERPL-MCNC: 94 MG/DL (ref 65–99)
HBA1C MFR BLD: 6.3 % (ref 0–5.6)
HCT VFR BLD AUTO: 48.3 % (ref 42–52)
HDLC SERPL-MCNC: 31 MG/DL
HGB BLD-MCNC: 15.9 G/DL (ref 14–18)
IMM GRANULOCYTES # BLD AUTO: 0.08 K/UL (ref 0–0.11)
IMM GRANULOCYTES NFR BLD AUTO: 1 % (ref 0–0.9)
LDLC SERPL CALC-MCNC: 41 MG/DL
LYMPHOCYTES # BLD AUTO: 2.69 K/UL (ref 1–4.8)
LYMPHOCYTES NFR BLD: 34.7 % (ref 22–41)
MCH RBC QN AUTO: 33.7 PG (ref 27–33)
MCHC RBC AUTO-ENTMCNC: 32.9 G/DL (ref 33.7–35.3)
MCV RBC AUTO: 102.3 FL (ref 81.4–97.8)
MONOCYTES # BLD AUTO: 0.61 K/UL (ref 0–0.85)
MONOCYTES NFR BLD AUTO: 7.9 % (ref 0–13.4)
NEUTROPHILS # BLD AUTO: 3.78 K/UL (ref 1.82–7.42)
NEUTROPHILS NFR BLD: 48.8 % (ref 44–72)
NRBC # BLD AUTO: 0 K/UL
NRBC BLD AUTO-RTO: 0 /100 WBC
PLATELET # BLD AUTO: 380 K/UL (ref 164–446)
PMV BLD AUTO: 8.7 FL (ref 9–12.9)
POTASSIUM SERPL-SCNC: 4.2 MMOL/L (ref 3.6–5.5)
PROT SERPL-MCNC: 7.8 G/DL (ref 6–8.2)
RBC # BLD AUTO: 4.72 M/UL (ref 4.7–6.1)
SODIUM SERPL-SCNC: 137 MMOL/L (ref 135–145)
TRIGL SERPL-MCNC: 182 MG/DL (ref 0–149)
WBC # BLD AUTO: 7.8 K/UL (ref 4.8–10.8)

## 2017-10-05 PROCEDURE — 36415 COLL VENOUS BLD VENIPUNCTURE: CPT

## 2017-10-05 PROCEDURE — 83036 HEMOGLOBIN GLYCOSYLATED A1C: CPT

## 2017-10-05 PROCEDURE — 85025 COMPLETE CBC W/AUTO DIFF WBC: CPT

## 2017-10-05 PROCEDURE — 80053 COMPREHEN METABOLIC PANEL: CPT

## 2017-10-05 PROCEDURE — 80061 LIPID PANEL: CPT

## 2017-10-11 ENCOUNTER — OFFICE VISIT (OUTPATIENT)
Dept: MEDICAL GROUP | Facility: MEDICAL CENTER | Age: 77
End: 2017-10-11
Payer: MEDICARE

## 2017-10-11 VITALS
WEIGHT: 203.6 LBS | HEIGHT: 70 IN | HEART RATE: 69 BPM | RESPIRATION RATE: 16 BRPM | TEMPERATURE: 98.7 F | OXYGEN SATURATION: 95 % | SYSTOLIC BLOOD PRESSURE: 124 MMHG | BODY MASS INDEX: 29.15 KG/M2 | DIASTOLIC BLOOD PRESSURE: 76 MMHG

## 2017-10-11 DIAGNOSIS — D75.89 MACROCYTOSIS WITHOUT ANEMIA: ICD-10-CM

## 2017-10-11 DIAGNOSIS — R73.01 IFG (IMPAIRED FASTING GLUCOSE): ICD-10-CM

## 2017-10-11 DIAGNOSIS — Z90.49 STATUS POST TOTAL COLECTOMY: ICD-10-CM

## 2017-10-11 DIAGNOSIS — G60.8 PERIPHERAL SENSORY-MOTOR AXONAL POLYNEUROPATHY: ICD-10-CM

## 2017-10-11 DIAGNOSIS — Z87.19 H/O ULCERATIVE COLITIS: ICD-10-CM

## 2017-10-11 DIAGNOSIS — I10 ESSENTIAL HYPERTENSION: ICD-10-CM

## 2017-10-11 DIAGNOSIS — R15.9 INCONTINENCE OF FECES, UNSPECIFIED FECAL INCONTINENCE TYPE: ICD-10-CM

## 2017-10-11 PROCEDURE — 99213 OFFICE O/P EST LOW 20 MIN: CPT | Performed by: INTERNAL MEDICINE

## 2017-10-11 NOTE — PROGRESS NOTES
CHIEF COMPLAINT  Chief Complaint   Patient presents with   • Follow-Up     labs, ostomy      HPI  Patient is a 77 y.o. male patient who presents today for the following     Status post total colectomy due to severe ulcerative colitis, fecal incontinence  The patient has remote colectomy due to ulcerative colitis, and developed recently, fecal incontinence, gradually worsening.   She requested surgery referral for possible ostomy.     Peripheral neuropathy   Stable.     Background:  Onset: ~ in 2009.    Complains of: dysesthesias and paresthesias of the feet  Course: Slowly, gradually worsening  He has been on vitamin B12 and folate supplements.  He was evaluated by neurology, Dr Dacosta in 16, no cause was found.      Hypertension, controlled  Meds: Verapamil, 240 mg QD, taking medication as prescribed.    He measuring BP at home, it has been < 125/85.  No headaches, vision problems, tinnitus.  No chest pain/pressure, palpitations, irregular heart beats, exertional dyspnea, peripheral edema.  Diet: regular, low carb  Low salt diet: no  Exercise: > 4 days a week; fitness / gym  BMI: 29  FH: neg     IFG, stable  He had elevated A1c and FBG..  Diet: lower carb diet.    Exercise: regular  BMI: 29  No polydipsia, polyphagia, polyuria.  No abdominal pain, weight loss, fatigue.  FH of DM: neg     Hypertriglyceridemia, controlled  Meds:  Gemfibrozil, 600 mg QD              - taking daily, as prescribed. No myalgias, muscle cramps or pain.    Diet / Exercise:  As above  BMI: Body mass index is 29 kg/(m^2).  FH:  Parents  Reviewed FLP, and below.     Macrocytosis  Stable.  The patient has had slightly elevated MCV, stable.  He has been on oral Vitamin B12 and folate.    Reviewed PMH, PSH, FH, SH, ALL, HCM/IMM, no changes  Reviewed MEDS, no changes    Patient Active Problem List    Diagnosis Date Noted   • Peripheral sensory-motor axonal polyneuropathy      Priority: High   • Decreased hearing of both ears 11/18/2014      Priority: Medium   • Macrocytosis without anemia 11/20/2012     Priority: Medium   • Hypertriglyceridemia 11/15/2011     Priority: Medium   • Arcus senilis 06/08/2017   • Seborrheic keratosis 06/08/2017   • Status post total colectomy 06/08/2017   • IFG (impaired fasting glucose) 11/14/2016   • Health care maintenance 10/08/2015   • Essential hypertension 08/26/2015   • BPH (benign prostatic hyperplasia) 05/20/2015     CURRENT MEDICATIONS  Current Outpatient Prescriptions   Medication Sig Dispense Refill   • folic acid (FOLVITE) 1 MG Tab TAKE 1 TAB BY MOUTH EVERY DAY. 90 Tab 0   • tamsulosin (FLOMAX) 0.4 MG capsule TAKE 1 CAP BY MOUTH ONE-HALF HOUR AFTER BREAKFAST. 90 Cap 1   • Glucosamine HCl (GLUCOSAMINE PO) Take  by mouth.     • allopurinol (ZYLOPRIM) 300 MG Tab TAKE 1 TAB BY MOUTH EVERY DAY. 90 Tab 1   • verapamil ER (CALAN-SR) 240 MG Tab CR Take 1 Tab by mouth every day. TAKE 1 TAB BY MOUTH EVERY DAY. 180 Tab 3   • gemfibrozil (LOPID) 600 MG Tab Take 1 Tab by mouth every day. TAKE 1 TAB BY MOUTH EVERY DAY. 90 Tab 3   • cyanocobalamin (VITAMIN B12) 1000 MCG Tab Take 1 Tab by mouth every day. 90 Tab 3   • aspirin 81 MG tablet Take 81 mg by mouth every day.       No current facility-administered medications for this visit.      ALLERGIES  Allergies: Review of patient's allergies indicates no known allergies.    PAST MEDICAL HISTORY  Past Medical History:   Diagnosis Date   • Uric acid nephrolithiasis 2011   • Hyperlipidemia    • Hypertension    • Other inflammatory and toxic neuropathy(357.89)    • Ulcerative colitis (CMS-HCC)     colectomy in 1990   • Unspecified cataract      SURGICAL HISTORY  He  has a past surgical history that includes colon resection (1990); abdominal exploration; and other (1990).    SOCIAL HISTORY  Social History   Substance Use Topics   • Smoking status: Never Smoker   • Smokeless tobacco: Never Used   • Alcohol use 0.0 oz/week      Comment: occ     Social History     Social History  "Narrative   • No narrative on file     FAMILY HISTORY  Family History   Problem Relation Age of Onset   • Cancer Sister      Skin cancer    • Hyperlipidemia Sister    • Hyperlipidemia Mother    • Hyperlipidemia Father    • Hypertension Neg Hx    • Heart Disease Neg Hx    • Diabetes Neg Hx    • Stroke Neg Hx      Family Status   Relation Status   • Sister  at age 81   • Mother  at age 80   • Father  at age 75   • Neg Hx      ROS   Constitutional: Negative for fever, chills.  HENT: Negative for congestion, sore throat.  Eyes: Negative for blurred vision.   Respiratory: Negative for cough, shortness of breath.  Cardiovascular: Negative for chest pain, palpitations.   Gastrointestinal: Negative for heartburn, nausea, abdominal pain. And per HPI.  Genitourinary: Negative for dysuria.  Musculoskeletal: Negative for significant myalgias, back pain and joint pain.   Skin: Negative for rash and itching.   Neuro: Negative for dizziness, weakness and headaches. And per HPI.  Endo/Heme/Allergies: Does not bruise/bleed easily. And per HPI.  Psychiatric/Behavioral: Negative for depression, anxiety    PHYSICAL EXAM   Blood pressure 124/76, pulse 69, temperature 37.1 °C (98.7 °F), resp. rate 16, height 1.778 m (5' 10\"), weight 92.4 kg (203 lb 9.6 oz), SpO2 95 %. Body mass index is 29.21 kg/m².  General:  NAD, well appearing  HEENT:   NC/AT, PERRLA, EOMI, TMs are clear. Oropharyngeal mucosa is pink,  without lesions;  no cervical / supraclavicular  lymphadenopathy, no thyromegaly.    Cardiovascular: RRR.   No m/r/g. No carotid bruits .      Lungs:   CTAB, no w/r/r, no respiratory distress.  Abdomen: Soft, NT/ND + BS; no suprapubic tenderness; no masses or hepatosplenomegaly.  Extremities:  2+ DP and radial pulses bilaterally.  No c/c/e.   Skin:  Warm, dry.  No erythema. No rash.   Neurologic: Alert & oriented x 3. CN II-XII grossly intact. Brachioradialis / knee DTR are 2/4, symmetric. Strength and sensation " grossly intact.  No focal deficits.  Psychiatric:  Affect normal, mood normal, judgment normal.    LABS     Labs are reviewed and discussed with a patient    Lab Results   Component Value Date/Time    CHOLSTRLTOT 108 10/05/2017 09:12 AM    LDL 41 10/05/2017 09:12 AM    HDL 31 (A) 10/05/2017 09:12 AM    TRIGLYCERIDE 182 (H) 10/05/2017 09:12 AM       Lab Results   Component Value Date/Time    SODIUM 137 10/05/2017 09:12 AM    POTASSIUM 4.2 10/05/2017 09:12 AM    CHLORIDE 107 10/05/2017 09:12 AM    CO2 20 10/05/2017 09:12 AM    GLUCOSE 94 10/05/2017 09:12 AM    BUN 22 10/05/2017 09:12 AM    CREATININE 1.07 10/05/2017 09:12 AM    CREATININE 1.1 07/10/2006 03:05 PM     Lab Results   Component Value Date/Time    ALKPHOSPHAT 57 10/05/2017 09:12 AM    ASTSGOT 18 10/05/2017 09:12 AM    ALTSGPT 11 10/05/2017 09:12 AM    TBILIRUBIN 0.7 10/05/2017 09:12 AM      Lab Results   Component Value Date/Time    HBA1C 6.3 (H) 10/05/2017 09:12 AM    HBA1C 6.1 (H) 06/02/2017 11:26 AM    HBA1C 5.9 (H) 04/27/2017 09:03 AM     No results found for: TSH  No results found for: FREET4  Lab Results   Component Value Date/Time    WBC 7.8 10/05/2017 09:12 AM    RBC 4.72 10/05/2017 09:12 AM    HEMOGLOBIN 15.9 10/05/2017 09:12 AM    HEMATOCRIT 48.3 10/05/2017 09:12 AM    .3 (H) 10/05/2017 09:12 AM    MCH 33.7 (H) 10/05/2017 09:12 AM    MCHC 32.9 (L) 10/05/2017 09:12 AM    MPV 8.7 (L) 10/05/2017 09:12 AM    NEUTSPOLYS 48.80 10/05/2017 09:12 AM    LYMPHOCYTES 34.70 10/05/2017 09:12 AM    MONOCYTES 7.90 10/05/2017 09:12 AM    EOSINOPHILS 5.40 10/05/2017 09:12 AM    BASOPHILS 2.20 (H) 10/05/2017 09:12 AM      IMAGING     None    ASSESMENT AND PLAN        1. Status post total colectomy  - REFERRAL TO GENERAL SURGERY  2. Incontinence of feces, unspecified fecal incontinence type  - REFERRAL TO GENERAL SURGERY  3. H/O ulcerative colitis  - REFERRAL TO GENERAL SURGERY    4. Peripheral sensory-motor axonal polyneuropathy  Stable, continue current  treatment  - COMP METABOLIC PANEL; Future    5. Essential hypertension  Stable, controlled, continue current treatment and monitoring blood pressure at home    6. IFG (impaired fasting glucose)  Discussed about risk to develop DM.   Advised low carb diet, exercise, watch for WT.   - COMP METABOLIC PANEL; Future  - HEMOGLOBIN A1C; Future    7. Macrocytosis without anemia  Stable, continue vitamin B12/folate supplements, follow-up CBC  - CBC WITHOUT DIFFERENTIAL; Future    Counseling:   - Smoking:  Nonsmoker    Followup: Return in about 4 months (around 2/11/2018) for Short.    All questions are answered.    Please note that this dictation was created using voice recognition software, and that there might be errors of oli and possibly content.

## 2017-10-26 ENCOUNTER — APPOINTMENT (OUTPATIENT)
Dept: SOCIAL WORK | Facility: CLINIC | Age: 77
End: 2017-10-26
Payer: MEDICARE

## 2017-10-26 PROCEDURE — G0008 ADMIN INFLUENZA VIRUS VAC: HCPCS | Performed by: REGISTERED NURSE

## 2017-10-26 PROCEDURE — 90662 IIV NO PRSV INCREASED AG IM: CPT | Performed by: REGISTERED NURSE

## 2017-10-26 PROCEDURE — 90736 HZV VACCINE LIVE SUBQ: CPT | Performed by: REGISTERED NURSE

## 2017-10-26 PROCEDURE — 90472 IMMUNIZATION ADMIN EACH ADD: CPT | Performed by: REGISTERED NURSE

## 2017-11-07 RX ORDER — LOPERAMIDE HYDROCHLORIDE 2 MG/1
2 CAPSULE ORAL 4 TIMES DAILY PRN
Status: ON HOLD | COMMUNITY
End: 2018-01-23

## 2017-11-14 ENCOUNTER — HOSPITAL ENCOUNTER (OUTPATIENT)
Facility: MEDICAL CENTER | Age: 77
End: 2017-11-16
Attending: EMERGENCY MEDICINE | Admitting: SURGERY
Payer: MEDICARE

## 2017-11-14 ENCOUNTER — HOSPITAL ENCOUNTER (OUTPATIENT)
Facility: MEDICAL CENTER | Age: 77
End: 2017-11-14
Attending: SURGERY | Admitting: SURGERY
Payer: MEDICARE

## 2017-11-14 VITALS
HEIGHT: 70 IN | SYSTOLIC BLOOD PRESSURE: 158 MMHG | BODY MASS INDEX: 28.25 KG/M2 | OXYGEN SATURATION: 96 % | RESPIRATION RATE: 18 BRPM | DIASTOLIC BLOOD PRESSURE: 83 MMHG | WEIGHT: 197.31 LBS | TEMPERATURE: 97.3 F | HEART RATE: 60 BPM

## 2017-11-14 DIAGNOSIS — Z98.890 S/P HEMORRHOIDECTOMY: ICD-10-CM

## 2017-11-14 DIAGNOSIS — Z87.19 S/P HEMORRHOIDECTOMY: ICD-10-CM

## 2017-11-14 DIAGNOSIS — N17.9 AKI (ACUTE KIDNEY INJURY) (HCC): ICD-10-CM

## 2017-11-14 DIAGNOSIS — R73.9 HYPERGLYCEMIA: ICD-10-CM

## 2017-11-14 DIAGNOSIS — R42 LIGHTHEADEDNESS: ICD-10-CM

## 2017-11-14 DIAGNOSIS — D72.820 LYMPHOCYTOSIS: ICD-10-CM

## 2017-11-14 DIAGNOSIS — E87.29 INCREASED ANION GAP METABOLIC ACIDOSIS: ICD-10-CM

## 2017-11-14 DIAGNOSIS — K62.5 RECTAL BLEEDING: ICD-10-CM

## 2017-11-14 DIAGNOSIS — D64.9 ANEMIA, UNSPECIFIED TYPE: ICD-10-CM

## 2017-11-14 PROCEDURE — 700101 HCHG RX REV CODE 250

## 2017-11-14 PROCEDURE — 160025 RECOVERY II MINUTES (STATS): Performed by: SURGERY

## 2017-11-14 PROCEDURE — 502704 HCHG DEVICE, LIGASURE IMPACT: Performed by: SURGERY

## 2017-11-14 PROCEDURE — 99285 EMERGENCY DEPT VISIT HI MDM: CPT

## 2017-11-14 PROCEDURE — 160009 HCHG ANES TIME/MIN: Performed by: SURGERY

## 2017-11-14 PROCEDURE — 160028 HCHG SURGERY MINUTES - 1ST 30 MINS LEVEL 3: Performed by: SURGERY

## 2017-11-14 PROCEDURE — 160002 HCHG RECOVERY MINUTES (STAT): Performed by: SURGERY

## 2017-11-14 PROCEDURE — 160039 HCHG SURGERY MINUTES - EA ADDL 1 MIN LEVEL 3: Performed by: SURGERY

## 2017-11-14 PROCEDURE — 160035 HCHG PACU - 1ST 60 MINS PHASE I: Performed by: SURGERY

## 2017-11-14 PROCEDURE — 700111 HCHG RX REV CODE 636 W/ 250 OVERRIDE (IP)

## 2017-11-14 PROCEDURE — 160047 HCHG PACU  - EA ADDL 30 MINS PHASE II: Performed by: SURGERY

## 2017-11-14 PROCEDURE — 502240 HCHG MISC OR SUPPLY RC 0272: Performed by: SURGERY

## 2017-11-14 PROCEDURE — 160046 HCHG PACU - 1ST 60 MINS PHASE II: Performed by: SURGERY

## 2017-11-14 PROCEDURE — 500445 HCHG HEMOSTAT, SURGICEL 4X8: Performed by: SURGERY

## 2017-11-14 PROCEDURE — 160048 HCHG OR STATISTICAL LEVEL 1-5: Performed by: SURGERY

## 2017-11-14 RX ORDER — LIDOCAINE HYDROCHLORIDE 10 MG/ML
0.5 INJECTION, SOLUTION INFILTRATION; PERINEURAL
Status: DISCONTINUED | OUTPATIENT
Start: 2017-11-14 | End: 2017-11-14 | Stop reason: HOSPADM

## 2017-11-14 RX ORDER — LIDOCAINE AND PRILOCAINE 25; 25 MG/G; MG/G
1 CREAM TOPICAL
Status: DISCONTINUED | OUTPATIENT
Start: 2017-11-14 | End: 2017-11-14 | Stop reason: HOSPADM

## 2017-11-14 RX ORDER — BUPIVACAINE HYDROCHLORIDE AND EPINEPHRINE 5; 5 MG/ML; UG/ML
INJECTION, SOLUTION EPIDURAL; INTRACAUDAL; PERINEURAL
Status: DISCONTINUED | OUTPATIENT
Start: 2017-11-14 | End: 2017-11-14 | Stop reason: HOSPADM

## 2017-11-14 RX ORDER — OXYCODONE HYDROCHLORIDE AND ACETAMINOPHEN 5; 325 MG/1; MG/1
1-2 TABLET ORAL EVERY 4 HOURS PRN
Qty: 60 TAB | Refills: 0 | Status: ON HOLD | OUTPATIENT
Start: 2017-11-14 | End: 2018-01-23

## 2017-11-14 RX ORDER — SODIUM CHLORIDE, SODIUM LACTATE, POTASSIUM CHLORIDE, CALCIUM CHLORIDE 600; 310; 30; 20 MG/100ML; MG/100ML; MG/100ML; MG/100ML
1000 INJECTION, SOLUTION INTRAVENOUS
Status: COMPLETED | OUTPATIENT
Start: 2017-11-14 | End: 2017-11-14

## 2017-11-14 RX ADMIN — SODIUM CHLORIDE, SODIUM LACTATE, POTASSIUM CHLORIDE, CALCIUM CHLORIDE 1000 ML: 600; 310; 30; 20 INJECTION, SOLUTION INTRAVENOUS at 09:58

## 2017-11-14 ASSESSMENT — PAIN SCALES - GENERAL
PAINLEVEL_OUTOF10: 0
PAINLEVEL_OUTOF10: 2

## 2017-11-14 NOTE — PROGRESS NOTES
Checked on patient and he had quite a bit of bleeding rectally, placed some gauze and another barrier pad. Dr. Tellez observed patient and placed some more packing. Will monitor patient, BP's are within normal ranges for patient. Dr. Tellez spoke with patient and he agreed to get a ride home.

## 2017-11-14 NOTE — OP REPORT
DATE OF SERVICE:  11/14/2017    PREOPERATIVE DIAGNOSIS:  Anal stenosis and thrombosed external hemorrhoid.    POSTOPERATIVE DIAGNOSIS:  Anal stenosis and thrombosed external hemorrhoid.    PROCEDURE:  1.  Dilation of anal stenosis.  2.  Evacuation of thrombosed hemorrhoids.    SURGEON:  Peter Tellez MD    ASSISTANT:  None.    ANESTHESIA:  General LMA.    ANESTHESIOLOGIST:  Iggy Joshi DO    ESTIMATED BLOOD LOSS:  10 mL    SPECIMENS:  None.    COMPLICATIONS:  None.    CONDITION:  Stable.    INDICATIONS FOR PROCEDURE:  This is a 77-year-old male who presents with a   history of J pouch creation.  He has problems with anal stenosis in the past   and had a dilation about 15-20 years ago.  Since then, he has slowly had a   recurrent stenosis and now presents with fecal incontinence secondary to this.    He also has some discomfort from a thrombosed external hemorrhoid.  Due to   this pain on exam in the office, we took him to the OR for anorectal exam   under anesthesia and possible dilation of the stenosis.    OPERATIVE FINDINGS:  Tight anal canal stenosis dilated with hemostasis.    Hemorrhoid evacuated.    OPERATIVE TECHNIQUE:  After informed consent was obtained, the patient was   taken to the operating room, placed in the supine position.  After adequate   LMA anesthesia was achieved, we switched to lithotomy position and the anus   and perineum were prepped and draped in sterile fashion.  Operation was begun   by performing a digital rectal and [place] retractor exam.  We noted a   very tight anal stenosis, but with loop and gentle dilation, we were able to   dilate this up to about 2.5 cm in diameter.  We then noticed some bleeding   posteriorly, which was dealt with a 2-0 Vicryl figure-of-eight suture as well   as cautery application.    Next, we noted the external thrombosed hemorrhoid.  A simple incision was   placed over this and the clot evacuated out of the hemorrhoid.  It was   hemostatic  at the conclusion of the case.    We packed the anal canal and noted no further bleeding.  Surgicel was placed.    Local anesthetic block was given with 0.5% Marcaine with epinephrine for   local anesthesia.  The procedure was concluded and the patient returned to the   PACU in stable condition.  All instruments counts were correct at the end of   the procedure.       ____________________________________     MD KIMBERLEY Chavira / MARIA M    DD:  11/14/2017 13:51:41  DT:  11/14/2017 14:32:12    D#:  [id number]  Job#:  [id number]

## 2017-11-14 NOTE — OR SURGEON
Immediate Post OP Note    PreOp Diagnosis: Anal Stenosis, Thrombosed hemorrhoid    PostOp Diagnosis: same    Procedure(s):  Dilation Anal Stenosis  Evacuation of Thrombosed Hemorrhoids  Contaminated procedure-III    Surgeon(s):  Peter Tellez M.D.    Anesthesiologist/Type of Anesthesia:  Anesthesiologist: Iggy Joshi D.O./General    Surgical Staff:  Circulator: Flora Whaley  Scrub Person: Roberto Rivera; Racheal Palmer    Specimens:  * No specimens in log *    Estimated Blood Loss: 10cc    Findings: Tight anal stenosis at I[place]-anal anastomosis dilated.  Thrombosed hemorrhoid evacuated with removal of clot    Complications: none        11/14/2017 11:27 AM Peter Tellez

## 2017-11-14 NOTE — PROGRESS NOTES
Patient's bleeding finally stopped and patient ready to discharge home with cab. Patient taken downstairs by CNA to cab. All belongings with patient, discharge instructions read and gone over with patient, nobody signed as patient has no family or friends available to come by for him. MD is aware that patient is going home alone and in a cab. All questions answered and patient feels fine to go home. Instructed patient to call MD right away if he begins to bleed again, he agrees to do so and understands.

## 2017-11-14 NOTE — PROGRESS NOTES
Received patient from PACU1, patient states that he is driving himself home today.Educated patient as to why he cannot drive home after surgery and he states that he has no other choice. I explained that we can call a cab for the patient and he refuses and states that he knows nobody who can pick him up. I explained that I would need to get a clearance from his surgeon to drive himself home. Notified Dr. Tellez in the OR of what patient wants and he will come talk with him when he is out of the OR. Patient awaiting to talk with MD.

## 2017-11-14 NOTE — DISCHARGE INSTRUCTIONS
ACTIVITY: Rest and take it easy for the first 24 hours.  A responsible adult is recommended to remain with you during that time.  It is normal to feel sleepy.  We encourage you to not do anything that requires balance, judgment or coordination.    MILD FLU-LIKE SYMPTOMS ARE NORMAL. YOU MAY EXPERIENCE GENERALIZED MUSCLE ACHES, THROAT IRRITATION, HEADACHE AND/OR SOME NAUSEA.    FOR 24 HOURS DO NOT:  Drive, operate machinery or run household appliances.  Drink beer or alcoholic beverages.   Make important decisions or sign legal documents.    SPECIAL INSTRUCTIONS:   Diet as tolerated    DIET: To avoid nausea, slowly advance diet as tolerated, avoiding spicy or greasy foods for the first day.  Add more substantial food to your diet according to your physician's instructions.  INCREASE FLUIDS AND FIBER TO AVOID CONSTIPATION.    SURGICAL DRESSING/BATHING:   May Shower  Warm baths several times a day    FOLLOW-UP APPOINTMENT:  A follow-up appointment should be arranged with your doctor in 1-2 weeks; call to schedule.    You should CALL YOUR PHYSICIAN if you develop:  Fever greater than 101 degrees F.  Pain not relieved by medication, or persistent nausea or vomiting.  Excessive bleeding (blood soaking through dressing) or unexpected drainage from the wound.  Extreme redness or swelling around the incision site, drainage of pus or foul smelling drainage.  Inability to urinate or empty your bladder within 8 hours.  Problems with breathing or chest pain.    You should call 911 if you develop problems with breathing or chest pain.  If you are unable to contact your doctor or surgical center, you should go to the nearest emergency room or urgent care center.  Physician's telephone #: Dr. Tellez [phone]    If any questions arise, call your doctor.  If your doctor is not available, please feel free to call the Surgical Center at [phone].  The Center is open Monday through Friday from 7AM to 7PM.  You can also call  the HEALTH HOTLINE open 24 hours/day, 7 days/week and speak to a nurse at [phone], or toll free at [phone].    A registered nurse may call you a few days after your surgery to see how you are doing after your procedure.    MEDICATIONS: Resume taking daily medication.  Take prescribed pain medication with food.  If no medication is prescribed, you may take non-aspirin pain medication if needed.  PAIN MEDICATION CAN BE VERY CONSTIPATING.  Take a stool softener or laxative such as senokot, pericolace, or milk of magnesia if needed.    Prescription given for percocet.  Last pain medication given at none in recovery.    If your physician has prescribed pain medication that includes Acetaminophen (Tylenol), do not take additional Acetaminophen (Tylenol) while taking the prescribed medication.    Depression / Suicide Risk    As you are discharged from this [facility] facility, it is important to learn how to keep safe from harming yourself.    Recognize the warning signs:  · Abrupt changes in personality, positive or negative- including increase in energy   · Giving away possessions  · Change in eating patterns- significant weight changes-  positive or negative  · Change in sleeping patterns- unable to sleep or sleeping all the time   · Unwillingness or inability to communicate  · Depression  · Unusual sadness, discouragement and loneliness  · Talk of wanting to die  · Neglect of personal appearance   · Rebelliousness- reckless behavior  · Withdrawal from people/activities they love  · Confusion- inability to concentrate     If you or a loved one observes any of these behaviors or has concerns about self-harm, here's what you can do:  · Talk about it- your feelings and reasons for harming yourself  · Remove any means that you might use to hurt yourself (examples: pills, rope, extension cords, firearm)  · Get professional help from the community (Mental Health, Substance Abuse, psychological  counseling)  · Do not be alone:Call your Safe Contact- someone whom you trust who will be there for you.  · Call your local CRISIS HOTLINE 637-1585 or 805-650-7486  · Call your local Children's Mobile Crisis Response Team [place] (884) 789-2727 or www.Tenders.es  · Call the toll free National Suicide Prevention Hotlines   · National Suicide Prevention Lifeline 827-419-UKLC (6837)  · National Hope Line Network 800-SUICIDE (537-8437)

## 2017-11-15 LAB
ALBUMIN SERPL BCP-MCNC: 3.6 G/DL (ref 3.2–4.9)
ALBUMIN/GLOB SERPL: 1.2 G/DL
ALP SERPL-CCNC: 50 U/L (ref 30–99)
ALT SERPL-CCNC: 18 U/L (ref 2–50)
ANION GAP SERPL CALC-SCNC: 15 MMOL/L (ref 0–11.9)
AST SERPL-CCNC: 27 U/L (ref 12–45)
BASOPHILS # BLD AUTO: 0.3 % (ref 0–1.8)
BASOPHILS # BLD: 0.05 K/UL (ref 0–0.12)
BILIRUB SERPL-MCNC: 0.6 MG/DL (ref 0.1–1.5)
BUN SERPL-MCNC: 33 MG/DL (ref 8–22)
CALCIUM SERPL-MCNC: 8.5 MG/DL (ref 8.5–10.5)
CHLORIDE SERPL-SCNC: 106 MMOL/L (ref 96–112)
CO2 SERPL-SCNC: 15 MMOL/L (ref 20–33)
CREAT SERPL-MCNC: 1.72 MG/DL (ref 0.5–1.4)
EOSINOPHIL # BLD AUTO: 0 K/UL (ref 0–0.51)
EOSINOPHIL NFR BLD: 0 % (ref 0–6.9)
ERYTHROCYTE [DISTWIDTH] IN BLOOD BY AUTOMATED COUNT: 51.8 FL (ref 35.9–50)
GFR SERPL CREATININE-BSD FRML MDRD: 39 ML/MIN/1.73 M 2
GLOBULIN SER CALC-MCNC: 3 G/DL (ref 1.9–3.5)
GLUCOSE SERPL-MCNC: 179 MG/DL (ref 65–99)
HCT VFR BLD AUTO: 31.6 % (ref 42–52)
HCT VFR BLD AUTO: 36.6 % (ref 42–52)
HGB BLD-MCNC: 12.2 G/DL (ref 14–18)
IMM GRANULOCYTES # BLD AUTO: 0.21 K/UL (ref 0–0.11)
IMM GRANULOCYTES NFR BLD AUTO: 1.1 % (ref 0–0.9)
LYMPHOCYTES # BLD AUTO: 1.03 K/UL (ref 1–4.8)
LYMPHOCYTES NFR BLD: 5.2 % (ref 22–41)
MAGNESIUM SERPL-MCNC: 1.7 MG/DL (ref 1.5–2.5)
MCH RBC QN AUTO: 33.8 PG (ref 27–33)
MCHC RBC AUTO-ENTMCNC: 33.3 G/DL (ref 33.7–35.3)
MCV RBC AUTO: 101.4 FL (ref 81.4–97.8)
MONOCYTES # BLD AUTO: 0.67 K/UL (ref 0–0.85)
MONOCYTES NFR BLD AUTO: 3.4 % (ref 0–13.4)
NEUTROPHILS # BLD AUTO: 18.03 K/UL (ref 1.82–7.42)
NEUTROPHILS NFR BLD: 90 % (ref 44–72)
NRBC # BLD AUTO: 0 K/UL
NRBC BLD AUTO-RTO: 0 /100 WBC
PLATELET # BLD AUTO: 354 K/UL (ref 164–446)
PMV BLD AUTO: 8.8 FL (ref 9–12.9)
POTASSIUM SERPL-SCNC: 4.3 MMOL/L (ref 3.6–5.5)
PROT SERPL-MCNC: 6.6 G/DL (ref 6–8.2)
RBC # BLD AUTO: 3.61 M/UL (ref 4.7–6.1)
SODIUM SERPL-SCNC: 136 MMOL/L (ref 135–145)
TROPONIN I SERPL-MCNC: 0.02 NG/ML (ref 0–0.04)
WBC # BLD AUTO: 20 K/UL (ref 4.8–10.8)

## 2017-11-15 PROCEDURE — 85025 COMPLETE CBC W/AUTO DIFF WBC: CPT

## 2017-11-15 PROCEDURE — 93005 ELECTROCARDIOGRAM TRACING: CPT | Performed by: EMERGENCY MEDICINE

## 2017-11-15 PROCEDURE — G0378 HOSPITAL OBSERVATION PER HR: HCPCS

## 2017-11-15 PROCEDURE — 80053 COMPREHEN METABOLIC PANEL: CPT

## 2017-11-15 PROCEDURE — 36415 COLL VENOUS BLD VENIPUNCTURE: CPT

## 2017-11-15 PROCEDURE — 84484 ASSAY OF TROPONIN QUANT: CPT

## 2017-11-15 PROCEDURE — 83735 ASSAY OF MAGNESIUM: CPT

## 2017-11-15 PROCEDURE — 85014 HEMATOCRIT: CPT | Mod: 59

## 2017-11-15 PROCEDURE — 700101 HCHG RX REV CODE 250: Performed by: SURGERY

## 2017-11-15 RX ORDER — BISACODYL 10 MG
10 SUPPOSITORY, RECTAL RECTAL
Status: DISCONTINUED | OUTPATIENT
Start: 2017-11-15 | End: 2017-11-15

## 2017-11-15 RX ORDER — SODIUM CHLORIDE AND POTASSIUM CHLORIDE 150; 900 MG/100ML; MG/100ML
INJECTION, SOLUTION INTRAVENOUS CONTINUOUS
Status: DISCONTINUED | OUTPATIENT
Start: 2017-11-15 | End: 2017-11-16 | Stop reason: HOSPADM

## 2017-11-15 RX ORDER — POLYETHYLENE GLYCOL 3350 17 G/17G
1 POWDER, FOR SOLUTION ORAL
Status: DISCONTINUED | OUTPATIENT
Start: 2017-11-15 | End: 2017-11-15

## 2017-11-15 RX ORDER — AMOXICILLIN 250 MG
2 CAPSULE ORAL 2 TIMES DAILY
Status: DISCONTINUED | OUTPATIENT
Start: 2017-11-15 | End: 2017-11-15

## 2017-11-15 RX ADMIN — POTASSIUM CHLORIDE AND SODIUM CHLORIDE 1000 ML: 900; 150 INJECTION, SOLUTION INTRAVENOUS at 08:36

## 2017-11-15 ASSESSMENT — LIFESTYLE VARIABLES
TOTAL SCORE: 0
HAVE YOU EVER FELT YOU SHOULD CUT DOWN ON YOUR DRINKING: NO
ALCOHOL_USE: YES
EVER_SMOKED: YES
TOTAL SCORE: 0
EVER FELT BAD OR GUILTY ABOUT YOUR DRINKING: NO
CONSUMPTION TOTAL: NEGATIVE
AVERAGE NUMBER OF DAYS PER WEEK YOU HAVE A DRINK CONTAINING ALCOHOL: 1
HAVE PEOPLE ANNOYED YOU BY CRITICIZING YOUR DRINKING: NO
HOW MANY TIMES IN THE PAST YEAR HAVE YOU HAD 5 OR MORE DRINKS IN A DAY: 0
TOTAL SCORE: 0
EVER HAD A DRINK FIRST THING IN THE MORNING TO STEADY YOUR NERVES TO GET RID OF A HANGOVER: NO
ON A TYPICAL DAY WHEN YOU DRINK ALCOHOL HOW MANY DRINKS DO YOU HAVE: 1

## 2017-11-15 ASSESSMENT — PATIENT HEALTH QUESTIONNAIRE - PHQ9
2. FEELING DOWN, DEPRESSED, IRRITABLE, OR HOPELESS: NOT AT ALL
SUM OF ALL RESPONSES TO PHQ QUESTIONS 1-9: 0
SUM OF ALL RESPONSES TO PHQ9 QUESTIONS 1 AND 2: 0
1. LITTLE INTEREST OR PLEASURE IN DOING THINGS: NOT AT ALL

## 2017-11-15 ASSESSMENT — PAIN SCALES - GENERAL
PAINLEVEL_OUTOF10: 0

## 2017-11-15 NOTE — PROGRESS NOTES
Some rectal bleeding noted during shift, bright red in color, patient has denied dizziness and chest pain since being on the floor, able to voice needs, call light and personal items within reach, he has remained alert and oriented, was able to ambulate from gurney to bed, gait was a little unsteady, treaded socks on, patient reported he fell before coming to the hospital last night, call light and personal items within reach

## 2017-11-15 NOTE — ED NOTES
"SHASTA Nick    Chief Complaint   Patient presents with   • Post Op Bleeding     J Pouch   • Lightheadedness       Pt BIBA.  Pt had J pouch done today by Dr Tellez.  Pt noticed an increase of bleeding at site +lightheadedness. Denies pain, nausea, vomiting.   Pt AOx4.  VSS    Blood Pressure : 139/76, Pulse: 96, Respiration: 18, Temperature: 36.1 °C (97 °F), Height: 177.8 cm (5' 10\"), Weight: 88.5 kg (195 lb), BMI (Calculated): 27.98, BSA (Calculated): 2.1, Pulse Oximetry: 97 %, O2 Delivery: None (Room Air)    "

## 2017-11-15 NOTE — PROGRESS NOTES
Bedside report received. Patient care assumed. Patient denies c/o or concerns at this time. Patient having rectal bleeding. Dr Tellez at bedside, able to see bloody output. Patient up to Bathroom. Patient very unsteady on his feet. PT/OT ordered. Patient noncompliant with call bell. Back to bed with Strip alarm on and in place. Bed alarm on as well. Call light within reach. Safety maintained.

## 2017-11-15 NOTE — PROGRESS NOTES
Attempts made to page Dr. Tellez to verify fluid orders. Awaiting response. Patient safety maintained.

## 2017-11-15 NOTE — CARE PLAN
Problem: Safety  Goal: Will remain free from falls  Outcome: PROGRESSING AS EXPECTED  Patient has a history of falls, admits to being dizzy and weak at times, educated on being high fall risk, call light given and shown how to properly use, patient is alert and oriented x4, able to verbalize understanding to teaching       Problem: Knowledge Deficit  Goal: Knowledge of disease process/condition, treatment plan, diagnostic tests, and medications will improve  Outcome: PROGRESSING AS EXPECTED  Patient educated on plan of care, encouraged to ask questions, patient verbalizes understanding to plan of care at this time

## 2017-11-15 NOTE — PROGRESS NOTES
2 rn skin check complete. Patient has rash to left lower extremity. Incision to rectum from surgery

## 2017-11-15 NOTE — ED PROVIDER NOTES
ED Provider Note    Scribed for Johnson Montano D.O. by Shen Gao. 11/15/2017  12:06 AM    Primary care provider: Ken Jordan M.D.   History obtained from: patient   History limited by: None     CHIEF COMPLAINT  Chief Complaint   Patient presents with   • Post Op Bleeding     J Pouch   • Lightheadedness        HPI    G Lobo Nick is a 77 y.o. male who presents to the ED for evaluation of post-op bleeding onset yesterday afternoon. Per patient, he had a J-pouch placed during a hemorroidectomy today at 11:00 AM yesterday by Dr. Tellez (General Surgery). The patient noticed increased bleeding around the surgical site after his surgery. Patient states he notified his surgeon of the bleeding, and he was told his bleeding would resolve by the evening. However, patient reports his bleeding never resolved. The patient states his bleeding worsened when he got home. He endorses associated lightheadedness. Patient explains his lightheadedness is severe, and he had a near-syncopal episode today as a result. He does not note any exacerbating or alleviating factors. He confirms he was able to eat some chicken soup and apple juice after the surgery today without any problems. The patient denies fever, nausea, vomiting, abdominal pain, chest pain.     Patient had hemorrhoid surgery and dilation of anal stenosis by Dr. Tellez and reports continued rectal bleeding since the surgery. He became concerned because he keeps having lightheaded episodes when he gets up from sitting position. He denies any pain. He denies any shortness breath or difficulty breathing. He denies any dysuria. Patient reports that he had a bowel movement since the surgery when he was at home but there was quite a bit of blood with the bowel movement. Patient is not on any blood thinners.      REVIEW OF SYSTEMS  Please see HPI for pertinent positives/negatives.  All other systems reviewed and are negative.     C.      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)    • Ulcerative colitis (CMS-HCC)     colectomy in 1990   • Unspecified cataract     cami IOLI        SURGICAL HISTORY  Past Surgical History:   Procedure Laterality Date   • OTHER NEUROLOGICAL SURG  2006   • COLON RESECTION  1990    [facility]   • OTHER  1990    Kidney stones removed    • ABDOMINAL EXPLORATION     • OTHER      cataract extraction with IOLI        SOCIAL HISTORY  Social History     Social History Main Topics   • Smoking status: Never Smoker   • Smokeless tobacco: Never Used   • Alcohol use 0.0 oz/week      Comment: occ   • Drug use: No   • Sexual activity: No        FAMILY HISTORY  Family History   Problem Relation Age of Onset   • Cancer Sister      Skin cancer    • Hyperlipidemia Sister    • Hyperlipidemia Mother    • Hyperlipidemia Father    • Hypertension Neg Hx    • Heart Disease Neg Hx    • Diabetes Neg Hx    • Stroke Neg Hx         CURRENT MEDICATIONS  No current facility-administered medications on file prior to encounter.      Current Outpatient Prescriptions on File Prior to Encounter   Medication Sig Dispense Refill   • oxycodone-acetaminophen (PERCOCET) 5-325 MG Tab Take 1-2 Tabs by mouth every four hours as needed (pain). 60 Tab 0   • loperamide (IMODIUM) 2 MG Cap Take 2 mg by mouth 4 times a day as needed for Diarrhea.     • vitamin D (CHOLECALCIFEROL) 1000 UNIT Tab Take 3,000 Units by mouth every day.     • folic acid (FOLVITE) 1 MG Tab TAKE 1 TAB BY MOUTH EVERY DAY. 90 Tab 0   • tamsulosin (FLOMAX) 0.4 MG capsule TAKE 1 CAP BY MOUTH ONE-HALF HOUR AFTER BREAKFAST. 90 Cap 1   • Glucosamine HCl (GLUCOSAMINE PO) Take 1 Tab by mouth every day.     • allopurinol (ZYLOPRIM) 300 MG Tab TAKE 1 TAB BY MOUTH EVERY DAY. 90 Tab 1   • verapamil ER (CALAN-SR) 240 MG Tab  "CR Take 1 Tab by mouth every day. TAKE 1 TAB BY MOUTH EVERY DAY. 180 Tab 3   • gemfibrozil (LOPID) 600 MG Tab Take 1 Tab by mouth every day. TAKE 1 TAB BY MOUTH EVERY DAY. 90 Tab 3   • cyanocobalamin (VITAMIN B12) 1000 MCG Tab Take 1 Tab by mouth every day. 90 Tab 3   • aspirin 81 MG tablet Take 81 mg by mouth every day.          ALLERGIES  No Known Allergies     PHYSICAL EXAM  VITAL SIGNS: /76   Pulse 96   Temp 36.1 °C (97 °F)   Resp 18   Ht 1.778 m (5' 10\")   Wt 88.5 kg (195 lb)   SpO2 97%   BMI 27.98 kg/m²  @MELISA[[id number]::@     Pulse ox interpretation:97% I interpret this pulse ox as normal     Constitutional: Well developed, well nourished, alert in no apparent distress, nontoxic appearance    HENT: No external signs of trauma, normocephalic, bilateral external ears normal, oropharynx moist and clear, nose normal    Eyes: PERRL, conjunctiva without erythema, no discharge, no icterus    Neck: Soft and supple, trachea midline, no stridor, no tenderness, no LAD, no JVD, good ROM    Cardiovascular: Regular rate and rhythm, no murmurs/rubs/gallops, strong distal pulses and good perfusion    Thorax & Lungs: No respiratory distress, CTAB   Abdomen: Soft, nontender, nondistended, no guarding, no rebound, normal BS    Rectal: Normal external exam except for small amount of blood, no active bleeding. Digital rectal exam deferred at patient's request.  Back: No CVAT    Extremities: No clubbing, no cyanosis, no edema, no gross deformity, good ROM, no tenderness, intact distal pulses with brisk cap refill    Skin: Warm, dry, no pallor/cyanosis, no rash noted    Lymphatic: No lymphadenopathy noted    Neuro: A/O times 3, no focal deficits noted    Psychiatric: Cooperative, normal mood and affect, normal judgement, appropriate for clinical situation          DIAGNOSTIC STUDIES / PROCEDURES    EKG  12 Lead EKG obtained at 0021 and interpreted by me:   Rate: 81   Rhythm: Sinus rhythm   Ectopy: None  Intervals: Normal "   Axis: Normal   Q Waves: None   QRS: Normal   ST segments:ST depression in inferior and anterior lateral leads  T Waves: Flattened T waves    Clinical Impression: Sinus rhythm with nonspecific ST T-wave changes       LABS  All labs reviewed by me.     Results for orders placed or performed during the hospital encounter of 11/14/17   CBC WITH DIFFERENTIAL   Result Value Ref Range    WBC 20.0 (H) 4.8 - 10.8 K/uL    RBC 3.61 (L) 4.70 - 6.10 M/uL    Hemoglobin 12.2 (L) 14.0 - 18.0 g/dL    Hematocrit 36.6 (L) 42.0 - 52.0 %    .4 (H) 81.4 - 97.8 fL    MCH 33.8 (H) 27.0 - 33.0 pg    MCHC 33.3 (L) 33.7 - 35.3 g/dL    RDW 51.8 (H) 35.9 - 50.0 fL    Platelet Count 354 164 - 446 K/uL    MPV 8.8 (L) 9.0 - 12.9 fL    Neutrophils-Polys 90.00 (H) 44.00 - 72.00 %    Lymphocytes 5.20 (L) 22.00 - 41.00 %    Monocytes 3.40 0.00 - 13.40 %    Eosinophils 0.00 0.00 - 6.90 %    Basophils 0.30 0.00 - 1.80 %    Immature Granulocytes 1.10 (H) 0.00 - 0.90 %    Nucleated RBC 0.00 /100 WBC    Neutrophils (Absolute) 18.03 (H) 1.82 - 7.42 K/uL    Lymphs (Absolute) 1.03 1.00 - 4.80 K/uL    Monos (Absolute) 0.67 0.00 - 0.85 K/uL    Eos (Absolute) 0.00 0.00 - 0.51 K/uL    Baso (Absolute) 0.05 0.00 - 0.12 K/uL    Immature Granulocytes (abs) 0.21 (H) 0.00 - 0.11 K/uL    NRBC (Absolute) 0.00 K/uL   COMP METABOLIC PANEL   Result Value Ref Range    Sodium 136 135 - 145 mmol/L    Potassium 4.3 3.6 - 5.5 mmol/L    Chloride 106 96 - 112 mmol/L    Co2 15 (L) 20 - 33 mmol/L    Anion Gap 15.0 (H) 0.0 - 11.9    Glucose 179 (H) 65 - 99 mg/dL    Bun 33 (H) 8 - 22 mg/dL    Creatinine 1.72 (H) 0.50 - 1.40 mg/dL    Calcium 8.5 8.5 - 10.5 mg/dL    AST(SGOT) 27 12 - 45 U/L    ALT(SGPT) 18 2 - 50 U/L    Alkaline Phosphatase 50 30 - 99 U/L    Total Bilirubin 0.6 0.1 - 1.5 mg/dL    Albumin 3.6 3.2 - 4.9 g/dL    Total Protein 6.6 6.0 - 8.2 g/dL    Globulin 3.0 1.9 - 3.5 g/dL    A-G Ratio 1.2 g/dL   TROPONIN   Result Value Ref Range    Troponin I 0.02 0.00 - 0.04  ng/mL   MAGNESIUM   Result Value Ref Range    Magnesium 1.7 1.5 - 2.5 mg/dL   ESTIMATED GFR   Result Value Ref Range    GFR If  47 (A) >60 mL/min/1.73 m 2    GFR If Non  39 (A) >60 mL/min/1.73 m 2           COURSE & MEDICAL DECISION MAKING  Nursing notes, VS, PMSFHx reviewed in chart.     Review of past medical records shows the patient with hemorrhoid surgery and anal dilation with Dr. Tellez yesterday.    Differential diagnoses considered include but are not limited to: Upper/lower GI bleed, hemorrhoids, anal fissure, gastroenteritis, gastritis, PUD, esophageal varices, caitie zimmer syndrome, diverticulitis, mesenteric ischemia, polyps, cancer       12:16 AM - The patient was seen and examined at bedside. Ordered CBC with differential, CMP, Troponin, Magnesium, EKG to evaluate. I discussed the treatment plan as above with the patient. I will also page Dr. Tellez for consult. Patient understood and verbalized agreement.     1:28 AM - Paged Dr. Tellez (General Surgery)    2:02 AM - I discussed the case and above findings with Dr. Tellez (General Surgery), who recommends overnight admission for further observation. The patient will be admitted under Dr. Tellez's care.    2:35 AM - I reevaluated the patient at bedside. The patient was sleeping comfortable. I updated the patient on his lab results as above. Patient was informed he will be admitted to Dr. Tellez overnight for further observation.  Patient understood the plan and verbalized agreement.       Delay in medical record entry due to [employer] down time.    DISPOSITION:  Patient will be admitted to Dr. Tellez in guarded condition.       FINAL IMPRESSION  1. Rectal bleeding    2. S/P hemorrhoidectomy    3. Lightheadedness    4. Anemia, unspecified type    5. Lymphocytosis    6. Increased anion gap metabolic acidosis    7. LADONNA (acute kidney injury) (CMS-HCC)    8. Hyperglycemia          IShen (Scribe), am scribing for, and  in the presence of, Johnson Montano D.O..    Electronically signed by: Shen Gao (Scribe), 11/15/2017    I, Johnson Montano D.O. personally performed the services described in this documentation, as scribed by Shen Gao in my presence, and it is both accurate and complete.      Portions of this record were made with voice recognition software and by scribes.  Despite my review, spelling/grammar/context errors may still remain.  Interpretation of this chart should be taken in this context.

## 2017-11-15 NOTE — H&P
CC: Rectal Bleeding    HPI: 77y M who yesterday was at [facility] for outpatient surgery.  At that time he had a dilation of an anal stenosis as well as evacuation of a thrombosed hemorrhoid.  He was discharged home form PACU but developed significant rectal bleeding overnight.  He was admitted through the ED. j Since admission he has had some blood leakage but this is minimal compared to before.  He denies any dizziness/lightheadedness at this point.  He would like to try PO this am.      PMHx: Basal Cell CA, GERD, HLD, HTN, Ulcerative colitis, Cataracts, Uric Acid Nephrolithiasis    PSHx: Total Proctocolectomy with J-pouch 1990; Cataract Extraction; Stone removal    Meds: see Med Rec, takes ASA 81mg a day    NKDA    FamHx: no colon/rectal cancers, no other pertinent family history    SocHx: No Tob/Drugs, occasional EtOH      ROS: negative except as above    Consitutional-above  HEENT- no visual changes, no sneezing or runny nose  Skin- no rashes or itching  Cardiovascular- no chest pain or palpatations  Respiratory- no SOB or cough  GI- above  - no dysurea  Neuro- no weakness or syncope  Musculoskeletal- no muscle or joint pain  Heme- no bleeding or bruising  Lymphatic- no enlarged nodes or previous splenectomy  Endocrine- No sweating or heat/cold intolerance  Allergy- No asthma or hives  Psychiatric- no depression or anxiety        Physical Exam:   AFVSS  A@O x3, NAD  NCAT, no scleral icterus  Neck nontender, no lymphadenopathy  Normal respiratory effort, no chest wall masses  RRR, 2+ pulses  Abdomen soft, no peritonitis, no masses  No active bleeding per rectum at this time, post-op so rectal exam deffered  Extremities warm and well perfused  No skin rashes or lesions    Labs: WBC 20, Hct 37, Plt 354  Lytes ok, Cr elevated at 1.7  Troponin 0.02    Radiology: N/A    A/P: 77y M here with rectal bleeding after anorectal surgery.  Hct ok at this point and no having active bleeding so will continue with watchful  waiting for now.  Pt will start PO, will check Hct later today and encourage ambulation.  Continue to observe.  Start IVF given elevated Cr and avoid any anticoagulation.

## 2017-11-15 NOTE — ED NOTES
Pt to floor with T7 RN.  Pt has tele box.  Patient has chart and all belongings. Report given to RN. Vitals up to date.

## 2017-11-16 VITALS
WEIGHT: 194.45 LBS | OXYGEN SATURATION: 96 % | DIASTOLIC BLOOD PRESSURE: 75 MMHG | SYSTOLIC BLOOD PRESSURE: 143 MMHG | BODY MASS INDEX: 27.84 KG/M2 | TEMPERATURE: 97.6 F | HEART RATE: 72 BPM | HEIGHT: 70 IN | RESPIRATION RATE: 18 BRPM

## 2017-11-16 LAB
ANION GAP SERPL CALC-SCNC: 8 MMOL/L (ref 0–11.9)
BUN SERPL-MCNC: 38 MG/DL (ref 8–22)
CALCIUM SERPL-MCNC: 7.8 MG/DL (ref 8.5–10.5)
CHLORIDE SERPL-SCNC: 107 MMOL/L (ref 96–112)
CO2 SERPL-SCNC: 22 MMOL/L (ref 20–33)
CREAT SERPL-MCNC: 1.19 MG/DL (ref 0.5–1.4)
ERYTHROCYTE [DISTWIDTH] IN BLOOD BY AUTOMATED COUNT: 53.1 FL (ref 35.9–50)
GFR SERPL CREATININE-BSD FRML MDRD: 59 ML/MIN/1.73 M 2
GLUCOSE SERPL-MCNC: 107 MG/DL (ref 65–99)
HCT VFR BLD AUTO: 31.2 % (ref 42–52)
HGB BLD-MCNC: 10.4 G/DL (ref 14–18)
MAGNESIUM SERPL-MCNC: 1.9 MG/DL (ref 1.5–2.5)
MCH RBC QN AUTO: 34.1 PG (ref 27–33)
MCHC RBC AUTO-ENTMCNC: 33.3 G/DL (ref 33.7–35.3)
MCV RBC AUTO: 102.3 FL (ref 81.4–97.8)
PLATELET # BLD AUTO: 294 K/UL (ref 164–446)
PMV BLD AUTO: 8.8 FL (ref 9–12.9)
POTASSIUM SERPL-SCNC: 3.9 MMOL/L (ref 3.6–5.5)
RBC # BLD AUTO: 3.05 M/UL (ref 4.7–6.1)
SODIUM SERPL-SCNC: 137 MMOL/L (ref 135–145)
WBC # BLD AUTO: 13.4 K/UL (ref 4.8–10.8)

## 2017-11-16 PROCEDURE — 80048 BASIC METABOLIC PNL TOTAL CA: CPT

## 2017-11-16 PROCEDURE — 83735 ASSAY OF MAGNESIUM: CPT

## 2017-11-16 PROCEDURE — 36415 COLL VENOUS BLD VENIPUNCTURE: CPT

## 2017-11-16 PROCEDURE — G0378 HOSPITAL OBSERVATION PER HR: HCPCS

## 2017-11-16 PROCEDURE — 85027 COMPLETE CBC AUTOMATED: CPT

## 2017-11-16 ASSESSMENT — PAIN SCALES - GENERAL: PAINLEVEL_OUTOF10: 0

## 2017-11-16 NOTE — PROGRESS NOTES
Walker delivered. Patient verbalizes understanding and is able to demonstrate walker use. Patient states he will fold it up and throw it in the closet, anyway. Educated patient on fall risks. Verbalized understanding.

## 2017-11-16 NOTE — DISCHARGE SUMMARY
Discharge Summary      DATE OF ADMISSION: 11/14/2017    DATE OF DISCHARGE: 11/16/17    ADMISSION DIAGNOSIS (ES):  ? Rectal Bleeding    DISCHARGE DIAGNOSIS (ES):  ? same    DISCHARGE CONDITION:  ? stable    CONSULTATIONS:  ? none    PROCEDURES:  ? none    BRIEF HPI:  ? 77y M here with rectal bleeding s/p dilation of anal stricture and evacuation of thrombosed hemorrhoid.      HOSPITAL COURSE:  ? After admission, pt had some minor bleeding which resolved by the am.  He was able to tolerate a diet and ambulate and Hct remained stable and was discharged home on POD 2.    MEDS:   No current outpatient prescriptions on file.       FOLLOW-UP:  ? Please call my office at [phone] to make an appointment in 1 weeks    DISCHARGE INSTRUCTIONS:

## 2017-11-16 NOTE — PROGRESS NOTES
Summoned into the room by tech, patient sitting up at bedside with iv tubing wrapped around arm attempting to pull out IV, patient demanded to have fluids turned off and removed. I attempted to explain importance of iv fluids but patient refused. Patient bowed up at staff and was very agitated all while gait was unsteady. He is upset with staff because we will not leave him unattended in restroom. Patient is also upset about bed alarm but he is impulsive and gait is very unsteady.

## 2017-11-16 NOTE — DISCHARGE INSTRUCTIONS
Discharge Instructions    Discharged to home by car with self. Discharged via wheelchair, hospital escort: Yes.  Special equipment needed: Walker    Be sure to schedule a follow-up appointment with your primary care doctor or any specialists as instructed.     Discharge Plan:   Influenza Vaccine Indication: Not indicated: Previously immunized this influenza season and > 8 years of age    I understand that a diet low in cholesterol, fat, and sodium is recommended for good health. Unless I have been given specific instructions below for another diet, I accept this instruction as my diet prescription.   Other diet: regular    Special Instructions: None    · Is patient discharged on Warfarin / Coumadin?   No     · Is patient Post Blood Transfusion?  No    Depression / Suicide Risk    As you are discharged from this [facility] facility, it is important to learn how to keep safe from harming yourself.    Recognize the warning signs:  · Abrupt changes in personality, positive or negative- including increase in energy   · Giving away possessions  · Change in eating patterns- significant weight changes-  positive or negative  · Change in sleeping patterns- unable to sleep or sleeping all the time   · Unwillingness or inability to communicate  · Depression  · Unusual sadness, discouragement and loneliness  · Talk of wanting to die  · Neglect of personal appearance   · Rebelliousness- reckless behavior  · Withdrawal from people/activities they love  · Confusion- inability to concentrate     If you or a loved one observes any of these behaviors or has concerns about self-harm, here's what you can do:  · Talk about it- your feelings and reasons for harming yourself  · Remove any means that you might use to hurt yourself (examples: pills, rope, extension cords, firearm)  · Get professional help from the community (Mental Health, Substance Abuse, psychological counseling)  · Do not be alone:Call your Safe Contact- someone whom  you trust who will be there for you.  · Call your local CRISIS HOTLINE 276-6599 or 303-240-5506  · Call your local Children's Mobile Crisis Response Team [place] (325) 783-6090 or www.[employer]  · Call the toll free National Suicide Prevention Hotlines   · National Suicide Prevention Lifeline 317-912-TAEU (1309)  · National [employer] Line Network 800-SUICIDE (369-4583)

## 2017-11-16 NOTE — PROGRESS NOTES
Patient resting in bed. No acute changes. No bloody out put noted. Patient up to bathroom. Very unsteady on his feet and noncompliant with calling before attempting to get OOB. Patient back to bed. Call light in reach. Safety maintained.

## 2017-11-16 NOTE — CARE PLAN
Problem: Safety  Goal: Will remain free from injury  Outcome: PROGRESSING AS EXPECTED    Intervention: Provide assistance with mobility  Patient educated multiple times on being a high fall risk, encouraged to use call light for assistance out of bed, gait is very unsteady and patient can be impulsive at times. Bed alarm is in place. Yellow socks on patient       Problem: Knowledge Deficit  Goal: Knowledge of the prescribed therapeutic regimen will improve  Outcome: PROGRESSING AS EXPECTED    Intervention: Discuss information regarding therpeutic regimen and document in education  Patient updated on plan of care, verbalized understanding, no questions at this time

## 2017-11-16 NOTE — PROGRESS NOTES
"11/16  S:  77 y.o.male s/p Anal Dilation, Hemorrhoid evacuation  POD# 2.  Patient admitted post-op with bleeding.  Since his bleeding issues have completely resolved.  He is having BM and tolerating a diet as well.  Still with some unsteadiness, suggested walker to him but he is not sure if he is going to use this or not.  Feels ready to go home this am    O:  Blood pressure 130/84, pulse 70, temperature 36.2 °C (97.2 °F), resp. rate 18, height 1.778 m (5' 10\"), weight 88.2 kg (194 lb 7.1 oz), SpO2 96 %.  I/O last 3 completed shifts:  In: 500 [P.O.:500]  Out: 750 [Urine:750]  Recent Labs      11/15/17   0035  11/16/17   0311   SODIUM  136  137   POTASSIUM  4.3  3.9   CHLORIDE  106  107   CO2  15*  22   GLUCOSE  179*  107*   BUN  33*  38*   CREATININE  1.72*  1.19   CALCIUM  8.5  7.8*     Recent Labs      11/15/17   0035  11/15/17   1215  11/16/17   0311   WBC  20.0*   --   13.4*   RBC  3.61*   --   3.05*   HEMOGLOBIN  12.2*   --   10.4*   HEMATOCRIT  36.6*  31.6*  31.2*   MCV  101.4*   --   102.3*   MCH  33.8*   --   34.1*   MCHC  33.3*   --   33.3*   RDW  51.8*   --   53.1*   PLATELETCT  354   --   294   MPV  8.8*   --   8.8*       Alert and Oriented x3, No Acute Distress  Normal Respiratory Effort  Abdomen soft, appropriately tender  Incisions/Bandages clean/dry/intact  Extremities warm and well perfused  Hct 36 ->31->31 since admission    A/P:  Pain control with PO meds- pt already has script at home  Diet as tolerated  Fluids SLIV  Ambulate tid and ad cheryle  D/C home today, continue perianal care as previously instructed and follow up with Dr Tellez    "

## 2017-11-16 NOTE — PROGRESS NOTES
Bedside report received. Patient care assumed. Patient alert and oriented. No c/o or concerns voiced at this time. Call light in reach. Safety maintained.

## 2017-11-20 LAB — EKG IMPRESSION: NORMAL

## 2017-11-20 RX ORDER — FOLIC ACID 1 MG/1
TABLET ORAL
Qty: 90 TAB | Refills: 0 | Status: SHIPPED | OUTPATIENT
Start: 2017-11-20 | End: 2018-02-17 | Stop reason: SDUPTHER

## 2017-11-20 RX ORDER — ALLOPURINOL 300 MG/1
TABLET ORAL
Qty: 90 TAB | Refills: 1 | Status: SHIPPED | OUTPATIENT
Start: 2017-11-20

## 2018-01-08 RX ORDER — TAMSULOSIN HYDROCHLORIDE 0.4 MG/1
CAPSULE ORAL
Qty: 90 CAP | Refills: 1 | Status: SHIPPED | OUTPATIENT
Start: 2018-01-08

## 2018-01-23 ENCOUNTER — HOSPITAL ENCOUNTER (INPATIENT)
Facility: MEDICAL CENTER | Age: 78
LOS: 4 days | DRG: 331 | End: 2018-01-27
Attending: SURGERY | Admitting: SURGERY
Payer: MEDICARE

## 2018-01-23 DIAGNOSIS — Z90.49 STATUS POST TOTAL COLECTOMY: ICD-10-CM

## 2018-01-23 DIAGNOSIS — Z71.89 OSTOMY NURSE CONSULTATION: ICD-10-CM

## 2018-01-23 LAB
ANION GAP SERPL CALC-SCNC: 10 MMOL/L (ref 0–11.9)
BUN SERPL-MCNC: 23 MG/DL (ref 8–22)
CALCIUM SERPL-MCNC: 8.8 MG/DL (ref 8.5–10.5)
CHLORIDE SERPL-SCNC: 104 MMOL/L (ref 96–112)
CO2 SERPL-SCNC: 20 MMOL/L (ref 20–33)
CREAT SERPL-MCNC: 1.19 MG/DL (ref 0.5–1.4)
ERYTHROCYTE [DISTWIDTH] IN BLOOD BY AUTOMATED COUNT: 48.4 FL (ref 35.9–50)
GLUCOSE BLD-MCNC: 141 MG/DL (ref 65–99)
GLUCOSE SERPL-MCNC: 94 MG/DL (ref 65–99)
HCT VFR BLD AUTO: 46.2 % (ref 42–52)
HGB BLD-MCNC: 15.6 G/DL (ref 14–18)
MCH RBC QN AUTO: 33.2 PG (ref 27–33)
MCHC RBC AUTO-ENTMCNC: 33.8 G/DL (ref 33.7–35.3)
MCV RBC AUTO: 98.3 FL (ref 81.4–97.8)
PLATELET # BLD AUTO: 342 K/UL (ref 164–446)
PMV BLD AUTO: 8.7 FL (ref 9–12.9)
POTASSIUM SERPL-SCNC: 4 MMOL/L (ref 3.6–5.5)
RBC # BLD AUTO: 4.7 M/UL (ref 4.7–6.1)
SODIUM SERPL-SCNC: 134 MMOL/L (ref 135–145)
WBC # BLD AUTO: 8.1 K/UL (ref 4.8–10.8)

## 2018-01-23 PROCEDURE — 501583 HCHG TROCAR, THRD CAN&SEAL 5X100: Performed by: SURGERY

## 2018-01-23 PROCEDURE — 500257: Performed by: SURGERY

## 2018-01-23 PROCEDURE — 700111 HCHG RX REV CODE 636 W/ 250 OVERRIDE (IP)

## 2018-01-23 PROCEDURE — C1765 ADHESION BARRIER: HCPCS | Performed by: SURGERY

## 2018-01-23 PROCEDURE — 501445 HCHG STAPLER, SKIN DISP: Performed by: SURGERY

## 2018-01-23 PROCEDURE — 700102 HCHG RX REV CODE 250 W/ 637 OVERRIDE(OP)

## 2018-01-23 PROCEDURE — 0DN80ZZ RELEASE SMALL INTESTINE, OPEN APPROACH: ICD-10-PCS | Performed by: SURGERY

## 2018-01-23 PROCEDURE — 160009 HCHG ANES TIME/MIN: Performed by: SURGERY

## 2018-01-23 PROCEDURE — A4314 CATH W/DRAINAGE 2-WAY LATEX: HCPCS | Performed by: SURGERY

## 2018-01-23 PROCEDURE — 160036 HCHG PACU - EA ADDL 30 MINS PHASE I: Performed by: SURGERY

## 2018-01-23 PROCEDURE — 501838 HCHG SUTURE GENERAL: Performed by: SURGERY

## 2018-01-23 PROCEDURE — A9270 NON-COVERED ITEM OR SERVICE: HCPCS | Performed by: SURGERY

## 2018-01-23 PROCEDURE — 160029 HCHG SURGERY MINUTES - 1ST 30 MINS LEVEL 4: Performed by: SURGERY

## 2018-01-23 PROCEDURE — 770006 HCHG ROOM/CARE - MED/SURG/GYN SEMI*

## 2018-01-23 PROCEDURE — 700102 HCHG RX REV CODE 250 W/ 637 OVERRIDE(OP): Performed by: SURGERY

## 2018-01-23 PROCEDURE — A9270 NON-COVERED ITEM OR SERVICE: HCPCS

## 2018-01-23 PROCEDURE — 160022 HCHG BLOCK: Performed by: SURGERY

## 2018-01-23 PROCEDURE — 82962 GLUCOSE BLOOD TEST: CPT

## 2018-01-23 PROCEDURE — 160002 HCHG RECOVERY MINUTES (STAT): Performed by: SURGERY

## 2018-01-23 PROCEDURE — 500002 HCHG ADHESIVE, DERMABOND: Performed by: SURGERY

## 2018-01-23 PROCEDURE — 700111 HCHG RX REV CODE 636 W/ 250 OVERRIDE (IP): Performed by: SURGERY

## 2018-01-23 PROCEDURE — 0D1B0Z4 BYPASS ILEUM TO CUTANEOUS, OPEN APPROACH: ICD-10-PCS | Performed by: SURGERY

## 2018-01-23 PROCEDURE — 501338 HCHG SHEARS, ENDO: Performed by: SURGERY

## 2018-01-23 PROCEDURE — 501570 HCHG TROCAR, SEPARATOR: Performed by: SURGERY

## 2018-01-23 PROCEDURE — 85027 COMPLETE CBC AUTOMATED: CPT

## 2018-01-23 PROCEDURE — 700101 HCHG RX REV CODE 250: Performed by: SURGERY

## 2018-01-23 PROCEDURE — 160035 HCHG PACU - 1ST 60 MINS PHASE I: Performed by: SURGERY

## 2018-01-23 PROCEDURE — 160048 HCHG OR STATISTICAL LEVEL 1-5: Performed by: SURGERY

## 2018-01-23 PROCEDURE — 0WJP4ZZ INSPECTION OF GASTROINTESTINAL TRACT, PERCUTANEOUS ENDOSCOPIC APPROACH: ICD-10-PCS | Performed by: SURGERY

## 2018-01-23 PROCEDURE — 700101 HCHG RX REV CODE 250

## 2018-01-23 PROCEDURE — 502571 HCHG PACK, LAP CHOLE: Performed by: SURGERY

## 2018-01-23 PROCEDURE — 80048 BASIC METABOLIC PNL TOTAL CA: CPT

## 2018-01-23 PROCEDURE — 160041 HCHG SURGERY MINUTES - EA ADDL 1 MIN LEVEL 4: Performed by: SURGERY

## 2018-01-23 RX ORDER — LIDOCAINE HYDROCHLORIDE 10 MG/ML
0.5 INJECTION, SOLUTION INFILTRATION; PERINEURAL
Status: ACTIVE | OUTPATIENT
Start: 2018-01-23 | End: 2018-01-24

## 2018-01-23 RX ORDER — ASPIRIN 81 MG/1
81 TABLET, CHEWABLE ORAL DAILY
Status: DISCONTINUED | OUTPATIENT
Start: 2018-01-23 | End: 2018-01-27 | Stop reason: HOSPADM

## 2018-01-23 RX ORDER — SODIUM CHLORIDE, SODIUM LACTATE, POTASSIUM CHLORIDE, CALCIUM CHLORIDE 600; 310; 30; 20 MG/100ML; MG/100ML; MG/100ML; MG/100ML
INJECTION, SOLUTION INTRAVENOUS CONTINUOUS
Status: DISCONTINUED | OUTPATIENT
Start: 2018-01-23 | End: 2018-01-24

## 2018-01-23 RX ORDER — KETOROLAC TROMETHAMINE 30 MG/ML
INJECTION, SOLUTION INTRAMUSCULAR; INTRAVENOUS
Status: COMPLETED
Start: 2018-01-23 | End: 2018-01-23

## 2018-01-23 RX ORDER — VERAPAMIL HYDROCHLORIDE 240 MG/1
240 TABLET, FILM COATED, EXTENDED RELEASE ORAL
Status: DISCONTINUED | OUTPATIENT
Start: 2018-01-23 | End: 2018-01-27 | Stop reason: HOSPADM

## 2018-01-23 RX ORDER — KETOROLAC TROMETHAMINE 30 MG/ML
15 INJECTION, SOLUTION INTRAMUSCULAR; INTRAVENOUS EVERY 6 HOURS
Status: COMPLETED | OUTPATIENT
Start: 2018-01-23 | End: 2018-01-26

## 2018-01-23 RX ORDER — BUPIVACAINE HYDROCHLORIDE AND EPINEPHRINE 5; 5 MG/ML; UG/ML
INJECTION, SOLUTION EPIDURAL; INTRACAUDAL; PERINEURAL
Status: DISCONTINUED | OUTPATIENT
Start: 2018-01-23 | End: 2018-01-23 | Stop reason: HOSPADM

## 2018-01-23 RX ORDER — ONDANSETRON 2 MG/ML
4 INJECTION INTRAMUSCULAR; INTRAVENOUS EVERY 4 HOURS PRN
Status: DISCONTINUED | OUTPATIENT
Start: 2018-01-23 | End: 2018-01-27 | Stop reason: HOSPADM

## 2018-01-23 RX ORDER — OXYCODONE HYDROCHLORIDE 10 MG/1
10 TABLET ORAL
Status: DISCONTINUED | OUTPATIENT
Start: 2018-01-23 | End: 2018-01-27 | Stop reason: HOSPADM

## 2018-01-23 RX ORDER — LOPERAMIDE HYDROCHLORIDE 2 MG/1
2 CAPSULE ORAL 4 TIMES DAILY PRN
Status: ON HOLD | COMMUNITY
End: 2018-01-27

## 2018-01-23 RX ORDER — OXYCODONE HYDROCHLORIDE 5 MG/1
5 TABLET ORAL
Status: DISCONTINUED | OUTPATIENT
Start: 2018-01-23 | End: 2018-01-27 | Stop reason: HOSPADM

## 2018-01-23 RX ORDER — OXYCODONE HYDROCHLORIDE AND ACETAMINOPHEN 5; 325 MG/1; MG/1
TABLET ORAL
Status: COMPLETED
Start: 2018-01-23 | End: 2018-01-23

## 2018-01-23 RX ORDER — DEXTROSE MONOHYDRATE 25 G/50ML
25 INJECTION, SOLUTION INTRAVENOUS
Status: DISCONTINUED | OUTPATIENT
Start: 2018-01-23 | End: 2018-01-27 | Stop reason: HOSPADM

## 2018-01-23 RX ORDER — ACETAMINOPHEN 500 MG
1000 TABLET ORAL EVERY 6 HOURS
Status: DISCONTINUED | OUTPATIENT
Start: 2018-01-23 | End: 2018-01-27 | Stop reason: HOSPADM

## 2018-01-23 RX ORDER — ALLOPURINOL 300 MG/1
300 TABLET ORAL
Status: DISCONTINUED | OUTPATIENT
Start: 2018-01-23 | End: 2018-01-27 | Stop reason: HOSPADM

## 2018-01-23 RX ORDER — MORPHINE SULFATE 4 MG/ML
4 INJECTION, SOLUTION INTRAMUSCULAR; INTRAVENOUS
Status: DISCONTINUED | OUTPATIENT
Start: 2018-01-23 | End: 2018-01-27 | Stop reason: HOSPADM

## 2018-01-23 RX ORDER — DEXAMETHASONE SODIUM PHOSPHATE 4 MG/ML
4 INJECTION, SOLUTION INTRA-ARTICULAR; INTRALESIONAL; INTRAMUSCULAR; INTRAVENOUS; SOFT TISSUE
Status: DISCONTINUED | OUTPATIENT
Start: 2018-01-23 | End: 2018-01-27 | Stop reason: HOSPADM

## 2018-01-23 RX ORDER — TAMSULOSIN HYDROCHLORIDE 0.4 MG/1
0.4 CAPSULE ORAL
Status: DISCONTINUED | OUTPATIENT
Start: 2018-01-23 | End: 2018-01-27 | Stop reason: HOSPADM

## 2018-01-23 RX ORDER — LIDOCAINE AND PRILOCAINE 25; 25 MG/G; MG/G
1 CREAM TOPICAL
Status: ACTIVE | OUTPATIENT
Start: 2018-01-23 | End: 2018-01-24

## 2018-01-23 RX ORDER — DIPHENHYDRAMINE HYDROCHLORIDE 50 MG/ML
25 INJECTION INTRAMUSCULAR; INTRAVENOUS EVERY 6 HOURS PRN
Status: DISCONTINUED | OUTPATIENT
Start: 2018-01-23 | End: 2018-01-27 | Stop reason: HOSPADM

## 2018-01-23 RX ORDER — DIPHENHYDRAMINE HCL 25 MG
25 TABLET ORAL EVERY 6 HOURS PRN
Status: DISCONTINUED | OUTPATIENT
Start: 2018-01-23 | End: 2018-01-27 | Stop reason: HOSPADM

## 2018-01-23 RX ORDER — MIDAZOLAM HYDROCHLORIDE 1 MG/ML
INJECTION INTRAMUSCULAR; INTRAVENOUS
Status: COMPLETED
Start: 2018-01-23 | End: 2018-01-23

## 2018-01-23 RX ORDER — CALCIUM CARBONATE 500 MG/1
500 TABLET, CHEWABLE ORAL
Status: DISCONTINUED | OUTPATIENT
Start: 2018-01-23 | End: 2018-01-27 | Stop reason: HOSPADM

## 2018-01-23 RX ORDER — SODIUM CHLORIDE AND POTASSIUM CHLORIDE 150; 900 MG/100ML; MG/100ML
INJECTION, SOLUTION INTRAVENOUS CONTINUOUS
Status: DISCONTINUED | OUTPATIENT
Start: 2018-01-23 | End: 2018-01-26

## 2018-01-23 RX ORDER — HYDROMORPHONE HYDROCHLORIDE 2 MG/ML
INJECTION, SOLUTION INTRAMUSCULAR; INTRAVENOUS; SUBCUTANEOUS
Status: COMPLETED
Start: 2018-01-23 | End: 2018-01-23

## 2018-01-23 RX ORDER — GEMFIBROZIL 600 MG/1
600 TABLET, FILM COATED ORAL
Status: DISCONTINUED | OUTPATIENT
Start: 2018-01-23 | End: 2018-01-27 | Stop reason: HOSPADM

## 2018-01-23 RX ADMIN — MIDAZOLAM 1 MG: 1 INJECTION INTRAMUSCULAR; INTRAVENOUS at 16:15

## 2018-01-23 RX ADMIN — KETOROLAC TROMETHAMINE 15 MG: 30 INJECTION, SOLUTION INTRAMUSCULAR at 17:00

## 2018-01-23 RX ADMIN — MIDAZOLAM 1 MG: 1 INJECTION INTRAMUSCULAR; INTRAVENOUS at 16:30

## 2018-01-23 RX ADMIN — HYDROMORPHONE HYDROCHLORIDE 0.5 MG: 2 INJECTION INTRAMUSCULAR; INTRAVENOUS; SUBCUTANEOUS at 18:00

## 2018-01-23 RX ADMIN — ACETAMINOPHEN 1000 MG: 500 TABLET ORAL at 23:42

## 2018-01-23 RX ADMIN — FENTANYL CITRATE 50 MCG: 50 INJECTION, SOLUTION INTRAMUSCULAR; INTRAVENOUS at 16:15

## 2018-01-23 RX ADMIN — SODIUM CHLORIDE, SODIUM LACTATE, POTASSIUM CHLORIDE, CALCIUM CHLORIDE: 600; 310; 30; 20 INJECTION, SOLUTION INTRAVENOUS at 12:45

## 2018-01-23 RX ADMIN — KETOROLAC TROMETHAMINE 15 MG: 30 INJECTION, SOLUTION INTRAMUSCULAR at 23:42

## 2018-01-23 RX ADMIN — OXYCODONE AND ACETAMINOPHEN 2 TABLET: 5; 325 TABLET ORAL at 16:15

## 2018-01-23 RX ADMIN — POTASSIUM CHLORIDE AND SODIUM CHLORIDE: 900; 150 INJECTION, SOLUTION INTRAVENOUS at 20:50

## 2018-01-23 RX ADMIN — HYDROMORPHONE HYDROCHLORIDE 0.5 MG: 2 INJECTION INTRAMUSCULAR; INTRAVENOUS; SUBCUTANEOUS at 16:30

## 2018-01-23 RX ADMIN — HYDROMORPHONE HYDROCHLORIDE 0.5 MG: 2 INJECTION INTRAMUSCULAR; INTRAVENOUS; SUBCUTANEOUS at 17:30

## 2018-01-23 RX ADMIN — FENTANYL CITRATE 50 MCG: 50 INJECTION, SOLUTION INTRAMUSCULAR; INTRAVENOUS at 16:30

## 2018-01-23 ASSESSMENT — PAIN SCALES - GENERAL
PAINLEVEL_OUTOF10: 2
PAINLEVEL_OUTOF10: 8
PAINLEVEL_OUTOF10: 0
PAINLEVEL_OUTOF10: 2
PAINLEVEL_OUTOF10: 0
PAINLEVEL_OUTOF10: 2
PAINLEVEL_OUTOF10: 5
PAINLEVEL_OUTOF10: 5
PAINLEVEL_OUTOF10: 0
PAINLEVEL_OUTOF10: 5
PAINLEVEL_OUTOF10: ASSUMED PAIN PRESENT
PAINLEVEL_OUTOF10: 2
PAINLEVEL_OUTOF10: 2
PAINLEVEL_OUTOF10: ASSUMED PAIN PRESENT
PAINLEVEL_OUTOF10: 2
PAINLEVEL_OUTOF10: 2

## 2018-01-23 ASSESSMENT — COPD QUESTIONNAIRES
HAVE YOU SMOKED AT LEAST 100 CIGARETTES IN YOUR ENTIRE LIFE: NO/DON'T KNOW
COPD SCREENING SCORE: 0
DURING THE PAST 4 WEEKS HOW MUCH DID YOU FEEL SHORT OF BREATH: NONE/LITTLE OF THE TIME
DO YOU EVER COUGH UP ANY MUCUS OR PHLEGM?: NO/ONLY WITH OCCASIONAL COLDS OR INFECTIONS

## 2018-01-23 ASSESSMENT — LIFESTYLE VARIABLES
HAVE PEOPLE ANNOYED YOU BY CRITICIZING YOUR DRINKING: NO
EVER_SMOKED: NEVER
CONSUMPTION TOTAL: NEGATIVE
AVERAGE NUMBER OF DAYS PER WEEK YOU HAVE A DRINK CONTAINING ALCOHOL: 0
EVER FELT BAD OR GUILTY ABOUT YOUR DRINKING: NO
HAVE YOU EVER FELT YOU SHOULD CUT DOWN ON YOUR DRINKING: NO
ALCOHOL_USE: YES
ON A TYPICAL DAY WHEN YOU DRINK ALCOHOL HOW MANY DRINKS DO YOU HAVE: 0
TOTAL SCORE: 0
EVER HAD A DRINK FIRST THING IN THE MORNING TO STEADY YOUR NERVES TO GET RID OF A HANGOVER: NO
HOW MANY TIMES IN THE PAST YEAR HAVE YOU HAD 5 OR MORE DRINKS IN A DAY: 0
TOTAL SCORE: 0
TOTAL SCORE: 0

## 2018-01-23 ASSESSMENT — PATIENT HEALTH QUESTIONNAIRE - PHQ9
1. LITTLE INTEREST OR PLEASURE IN DOING THINGS: NOT AT ALL
SUM OF ALL RESPONSES TO PHQ9 QUESTIONS 1 AND 2: 0
SUM OF ALL RESPONSES TO PHQ QUESTIONS 1-9: 0
2. FEELING DOWN, DEPRESSED, IRRITABLE, OR HOPELESS: NOT AT ALL

## 2018-01-23 NOTE — OR NURSING
"Pt is seeing a dermatologist for some dry skin areas on his right and left feet. He says he uses a \"prescription steroid cream\" but is not sure of the name. Pictures documented in epic.  "

## 2018-01-23 NOTE — OR SURGEON
Immediate Post OP Note    PreOp Diagnosis: Incontinence; Hx J-pouch    PostOp Diagnosis: none    Procedure(s):  ILEOSTOMY CREATION LAPAROSCOPIC; CONVERTED TO OPEN AT 1500 - Wound Class: Clean Contaminated    Surgeon(s):  Peter Tellez M.D.    Anesthesiologist/Type of Anesthesia:  Anesthesiologist: Ibrahima Aiken M.D./General    Surgical Staff:  Assistant: CARRIE Miller  Circulator: Rosa Montes R.N.  Scrub Person: Nani Aleman    Specimens:  * No specimens in log *    Estimated Blood Loss: 50cc    Findings: Dense intraloop intestinal adhesions on laparoscopy necessitating open procedure.  Adhesions of distal bowel removed and loop ileostomy matured in RUQ with hemostasis    Complications: none        1/23/2018 3:58 PM Peter Tellez

## 2018-01-24 LAB
ANION GAP SERPL CALC-SCNC: 9 MMOL/L (ref 0–11.9)
BUN SERPL-MCNC: 28 MG/DL (ref 8–22)
CALCIUM SERPL-MCNC: 8.8 MG/DL (ref 8.5–10.5)
CHLORIDE SERPL-SCNC: 107 MMOL/L (ref 96–112)
CO2 SERPL-SCNC: 20 MMOL/L (ref 20–33)
CREAT SERPL-MCNC: 1.1 MG/DL (ref 0.5–1.4)
ERYTHROCYTE [DISTWIDTH] IN BLOOD BY AUTOMATED COUNT: 48.7 FL (ref 35.9–50)
GLUCOSE BLD-MCNC: 139 MG/DL (ref 65–99)
GLUCOSE BLD-MCNC: 161 MG/DL (ref 65–99)
GLUCOSE SERPL-MCNC: 140 MG/DL (ref 65–99)
HCT VFR BLD AUTO: 41.7 % (ref 42–52)
HGB BLD-MCNC: 14 G/DL (ref 14–18)
MAGNESIUM SERPL-MCNC: 1.9 MG/DL (ref 1.5–2.5)
MCH RBC QN AUTO: 33.6 PG (ref 27–33)
MCHC RBC AUTO-ENTMCNC: 33.6 G/DL (ref 33.7–35.3)
MCV RBC AUTO: 100 FL (ref 81.4–97.8)
PLATELET # BLD AUTO: 326 K/UL (ref 164–446)
PMV BLD AUTO: 8.7 FL (ref 9–12.9)
POTASSIUM SERPL-SCNC: 4.5 MMOL/L (ref 3.6–5.5)
RBC # BLD AUTO: 4.17 M/UL (ref 4.7–6.1)
SODIUM SERPL-SCNC: 136 MMOL/L (ref 135–145)
WBC # BLD AUTO: 10.7 K/UL (ref 4.8–10.8)

## 2018-01-24 PROCEDURE — 700102 HCHG RX REV CODE 250 W/ 637 OVERRIDE(OP): Performed by: SURGERY

## 2018-01-24 PROCEDURE — 700101 HCHG RX REV CODE 250: Performed by: SURGERY

## 2018-01-24 PROCEDURE — 83735 ASSAY OF MAGNESIUM: CPT

## 2018-01-24 PROCEDURE — 770006 HCHG ROOM/CARE - MED/SURG/GYN SEMI*

## 2018-01-24 PROCEDURE — 85027 COMPLETE CBC AUTOMATED: CPT

## 2018-01-24 PROCEDURE — 36415 COLL VENOUS BLD VENIPUNCTURE: CPT

## 2018-01-24 PROCEDURE — 700111 HCHG RX REV CODE 636 W/ 250 OVERRIDE (IP): Performed by: SURGERY

## 2018-01-24 PROCEDURE — 82962 GLUCOSE BLOOD TEST: CPT

## 2018-01-24 PROCEDURE — A9270 NON-COVERED ITEM OR SERVICE: HCPCS | Performed by: SURGERY

## 2018-01-24 PROCEDURE — 80048 BASIC METABOLIC PNL TOTAL CA: CPT

## 2018-01-24 RX ADMIN — ASPIRIN 81 MG: 81 TABLET, CHEWABLE ORAL at 07:59

## 2018-01-24 RX ADMIN — ACETAMINOPHEN 1000 MG: 500 TABLET ORAL at 11:57

## 2018-01-24 RX ADMIN — VERAPAMIL HYDROCHLORIDE 240 MG: 240 TABLET, FILM COATED, EXTENDED RELEASE ORAL at 17:33

## 2018-01-24 RX ADMIN — TAMSULOSIN HYDROCHLORIDE 0.4 MG: 0.4 CAPSULE ORAL at 07:59

## 2018-01-24 RX ADMIN — ALLOPURINOL 300 MG: 300 TABLET ORAL at 17:32

## 2018-01-24 RX ADMIN — KETOROLAC TROMETHAMINE 15 MG: 30 INJECTION, SOLUTION INTRAMUSCULAR at 11:57

## 2018-01-24 RX ADMIN — ACETAMINOPHEN 1000 MG: 500 TABLET ORAL at 05:24

## 2018-01-24 RX ADMIN — ENOXAPARIN SODIUM 40 MG: 100 INJECTION SUBCUTANEOUS at 07:59

## 2018-01-24 RX ADMIN — KETOROLAC TROMETHAMINE 15 MG: 30 INJECTION, SOLUTION INTRAMUSCULAR at 05:24

## 2018-01-24 RX ADMIN — ACETAMINOPHEN 1000 MG: 500 TABLET ORAL at 17:32

## 2018-01-24 RX ADMIN — POTASSIUM CHLORIDE AND SODIUM CHLORIDE: 900; 150 INJECTION, SOLUTION INTRAVENOUS at 12:11

## 2018-01-24 RX ADMIN — GEMFIBROZIL 600 MG: 600 TABLET ORAL at 17:33

## 2018-01-24 RX ADMIN — KETOROLAC TROMETHAMINE 15 MG: 30 INJECTION, SOLUTION INTRAMUSCULAR at 17:32

## 2018-01-24 ASSESSMENT — PAIN SCALES - GENERAL: PAINLEVEL_OUTOF10: 0

## 2018-01-24 NOTE — OP REPORT
DATE OF SERVICE:  01/23/2018    PREOPERATIVE DIAGNOSIS:  Incontinence, a history of J pouch.    POSTOPERATIVE DIAGNOSIS:  Incontinence, a history of J pouch.    PROCEDURE:  1.  Laparoscopic converted to open ileostomy placement.  2.  Lysis of adhesions.    SURGEON:  Peter Tellez MD.    ASSISTANT:  RADHA Colin.    ANESTHESIA:  General endotracheal.    ANESTHESIOLOGIST:  Dr. Aiken.    ESTIMATED BLOOD LOSS:  50 mL.    SPECIMENS:  None.    COMPLICATIONS:  None.    CONDITION:  Stable.    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  He has begun having   incontinence and has problems with hemorrhoids and anal stricture.  These were   surgically treated and have healed, but he still has incontinence issues   along with diarrhea.  We discussed alternatives therapies for his issues as he   no longer has a stricture present, but patient would like to have an   ileostomy placed again as he functioned quite well with this in the remote   past when his J pouch was first placed and this would be a definitive   treatment for his incontinence; therefore, we decided to proceed with   ileostomy placement.  Risks, benefits, alternatives of surgery explained to   the patient before proceeding.    OPERATIVE FINDINGS:  Dense interloop abdominal intestinal adhesions,   necessitating an open laparotomy.  Adhesiolysis was performed for greater than   35 minutes.  A loop ileostomy placed in the right upper quadrant with   hemostasis.    OPERATIVE TECHNIQUE:  After informed consent was obtained, the patient was   taken to the operating room, placed in the supine position.  After adequate   endotracheal anesthesia was achieved, the abdomen was prepped and draped in a   sterile fashion.  Operation was begun by placing a right upper quadrant 5 mm   incision at the planned ileostomy site through which 5 mm trocar was   introduced into the abdomen  using Optiview technique.  After pneumoperitoneum   was achieved, additional trocars were placed, two 5 mm trocars in the left   upper quadrant where there were fewest adhesions.  We began by using scissors   and cautery to take down some omental adhesions in the anterior abdominal   wall.  With this dissection, we were able to visualize the pelvis, but   discovered that there were a great many interloop adhesions present throughout   the abdomen and we were not going to be able to access the pelvis and   determine the bowel routing adequately without an open procedure.  Therefore,   pneumoperitoneum was reduced and all trocars were renewed.    Next, we made a midline laparotomy incision which was carried down with   electrocautery to the level of the fascia.  This was incised in the midline   and the abdomen was sharply entered.  We opened the incision completely in the   lower midline and began lysing adhesions for greater than 35 minutes.  We   were able to eventually reduce the bowel off the lower abdominal wall.  We   were then able to identify a large dilated loop of bowel coming up out of the   pelvis, which we felt represented the J pouch.  After further dissection, it   was determined no other loops were entering the pelvis.  We traced this bowel   loop proximally and found the old small bowel anastomosis from his previous   ileostomy reversal as well.  Further dissection confirmed the identity of this   piece of bowel and we finally had a loop free that could be matured as an   ileostomy.  Some deserosalizations were repaired, but no full thickness   enterotomies were noted during the case.    A circular incision was made at the planned ileostomy site in the right upper   quadrant, carried down with electrocautery to the level of the fascia.  This   was incised in a cruciate fashion.  Muscles spread apart and the posterior   sheath opened.  We delivered the loop of bowel and later matured this at the   end  of the case as a loop ileostomy.  We then irrigated the abdomen with 2   liters of warm saline, placed a family pack of Seprafilm and closed the   abdominal wall with 2 running #1 looped PDS sutures tied in the midline.  Deep   tissues were closed with 3-0 Vicryl, skin with staples as were the 2   laparoscopic ports in the left upper quadrant.    Finally, we opened and matured the ostomy as a loop ileostomy using 3-0 Vicryl   sutures with a odessa budding technique.  Ostomy appliance was placed.  The   patient returned to the PACU in stable condition.  All instruments counts were   correct at the end of the procedure.       ____________________________________     MD KIMBERLEY Chavira / MARIA M    DD:  01/23/2018 19:30:18  DT:  01/23/2018 20:14:14    D#:  [id number]  Job#:  [id number]

## 2018-01-24 NOTE — PROGRESS NOTES
Dual assessment completed by two Rns. Patient has skin lesions to trunk of body. Wounds noted to ankles bilaterally, unable to assess due to dressings in place. Davis catheter in place draining clear yellow urine to gravity.

## 2018-01-24 NOTE — DISCHARGE PLANNING
Transitional care navigator:    Pending discharge of home patient has been identified as being appropriate for home health services. Please consider referral to HH prior to DC home.

## 2018-01-24 NOTE — PROGRESS NOTES
Assumed care at 0700. Received report from night shift RN. Bedside report completed. AOx4.    Denies pain.  Denies nausea.  Tolerating diet. Awaiting void post catheter removal. +BM in ostomy    IVF infusing.   Noted dressings to b/l ankles, pt refusing assessment.  Pictures were taken on admit.  Pt also refusing wound care consult-states he will continue to see his Dermatologist  Pt call light and belongings within reach, fall precautions in place.

## 2018-01-24 NOTE — WOUND TEAM
"[facility]  Inpatient Services  New Ostomy Management & Teaching    HPI:  Reviewed  PMH: Reviewed   SH: Reviewed    Subjective: \"I had one 28 years ago.\"    Objective: appliance leaking     Ostomy type: open ileostomy   Stoma location: RUQ   Stoma assessment:    Appearance:red, moist, bleeds easily, oval    Size: 1.8\"    Protrusion: ~1\"    Output: moderated green with blood clot    MC jxn intact    Peristomal skin: intact close to incision     Ostomy Appliance (type and size):two piece 2.75\" with paste ring    Interventions:Reviewed appliance pieces, ileostomy and encouraged pt to read \"Understanding your ileostomy.\" Removed appliance, cleaned skin, dried. Made pattern cut barrier and applied paste ring to barrier. Attached distal part of pouch and pt finished the rest. He closed the end. Secure start completed.      Pt education: Questions and concerns addressed. He performed scant hands on, needs to  front of mirror.     Evaluation:new open ileostomy. Had one years ago. Needs more hands-on.     Plan: Ostomy nurses to continue to follow for ostomy needs and teaching. Will see again tomorrow for more hands-on     Anticipated discharge needs: Supplies, supplier information, possible HH   "

## 2018-01-24 NOTE — PROGRESS NOTES
"1/24  S:  77 y.o.male s/p Open Ileostomy Placement  POD# 1.  Patient doing well this morning, denies any nausea or emesis, starting to have stoma output already.  Pain is very well controlled and he denies any nausea or emesis    O:  Blood pressure 115/77, pulse 67, temperature 36 °C (96.8 °F), resp. rate 16, height 1.778 m (5' 10\"), weight 87.5 kg (192 lb 14.4 oz), SpO2 96 %.  I/O last 3 completed shifts:  In: 1830 [P.O.:830; I.V.:1000]  Out: 975 [Urine:525; Stool/Urine:400]  Recent Labs      01/23/18   1311  01/24/18   0403   SODIUM  134*  136   POTASSIUM  4.0  4.5   CHLORIDE  104  107   CO2  20  20   GLUCOSE  94  140*   BUN  23*  28*   CREATININE  1.19  1.10   CALCIUM  8.8  8.8     Recent Labs      01/23/18   1311  01/24/18   0403   WBC  8.1  10.7   RBC  4.70  4.17*   HEMOGLOBIN  15.6  14.0   HEMATOCRIT  46.2  41.7*   MCV  98.3*  100.0*   MCH  33.2*  33.6*   MCHC  33.8  33.6*   RDW  48.4  48.7   PLATELETCT  342  326   MPV  8.7*  8.7*       Alert and Oriented x3, No Acute Distress  Normal Respiratory Effort  Abdomen soft, appropriately tender  Incisions/Bandages clean/dry/intact  Extremities warm and well perfused  Ostomy pink and healthy, output thin liquid stool    A/P:  Pain control with PO meds, IV prn only, minimize narcotics  Diet advance slowly  Fluids continue until good volumes PO  Ambulate tid and ad cheryle  Davis out this am    "

## 2018-01-25 LAB
ANION GAP SERPL CALC-SCNC: 0 MMOL/L (ref 0–11.9)
BUN SERPL-MCNC: 39 MG/DL (ref 8–22)
CALCIUM SERPL-MCNC: 8.6 MG/DL (ref 8.5–10.5)
CHLORIDE SERPL-SCNC: 109 MMOL/L (ref 96–112)
CO2 SERPL-SCNC: 27 MMOL/L (ref 20–33)
CREAT SERPL-MCNC: 1 MG/DL (ref 0.5–1.4)
ERYTHROCYTE [DISTWIDTH] IN BLOOD BY AUTOMATED COUNT: 47.8 FL (ref 35.9–50)
GLUCOSE SERPL-MCNC: 120 MG/DL (ref 65–99)
HCT VFR BLD AUTO: 36.9 % (ref 42–52)
HGB BLD-MCNC: 12.3 G/DL (ref 14–18)
MCH RBC QN AUTO: 32.9 PG (ref 27–33)
MCHC RBC AUTO-ENTMCNC: 33.3 G/DL (ref 33.7–35.3)
MCV RBC AUTO: 98.7 FL (ref 81.4–97.8)
PLATELET # BLD AUTO: 308 K/UL (ref 164–446)
PMV BLD AUTO: 8.7 FL (ref 9–12.9)
POTASSIUM SERPL-SCNC: 4.1 MMOL/L (ref 3.6–5.5)
RBC # BLD AUTO: 3.74 M/UL (ref 4.7–6.1)
SODIUM SERPL-SCNC: 136 MMOL/L (ref 135–145)
WBC # BLD AUTO: 10 K/UL (ref 4.8–10.8)

## 2018-01-25 PROCEDURE — 700111 HCHG RX REV CODE 636 W/ 250 OVERRIDE (IP): Performed by: SURGERY

## 2018-01-25 PROCEDURE — 700102 HCHG RX REV CODE 250 W/ 637 OVERRIDE(OP): Performed by: SURGERY

## 2018-01-25 PROCEDURE — 700101 HCHG RX REV CODE 250: Performed by: SURGERY

## 2018-01-25 PROCEDURE — 80048 BASIC METABOLIC PNL TOTAL CA: CPT

## 2018-01-25 PROCEDURE — 36415 COLL VENOUS BLD VENIPUNCTURE: CPT

## 2018-01-25 PROCEDURE — 85027 COMPLETE CBC AUTOMATED: CPT

## 2018-01-25 PROCEDURE — A9270 NON-COVERED ITEM OR SERVICE: HCPCS | Performed by: SURGERY

## 2018-01-25 PROCEDURE — 770006 HCHG ROOM/CARE - MED/SURG/GYN SEMI*

## 2018-01-25 RX ADMIN — KETOROLAC TROMETHAMINE 15 MG: 30 INJECTION, SOLUTION INTRAMUSCULAR at 00:24

## 2018-01-25 RX ADMIN — ACETAMINOPHEN 1000 MG: 500 TABLET ORAL at 17:23

## 2018-01-25 RX ADMIN — POTASSIUM CHLORIDE AND SODIUM CHLORIDE: 900; 150 INJECTION, SOLUTION INTRAVENOUS at 11:44

## 2018-01-25 RX ADMIN — ACETAMINOPHEN 1000 MG: 500 TABLET ORAL at 23:57

## 2018-01-25 RX ADMIN — KETOROLAC TROMETHAMINE 15 MG: 30 INJECTION, SOLUTION INTRAMUSCULAR at 11:43

## 2018-01-25 RX ADMIN — ACETAMINOPHEN 1000 MG: 500 TABLET ORAL at 05:08

## 2018-01-25 RX ADMIN — ALLOPURINOL 300 MG: 300 TABLET ORAL at 17:23

## 2018-01-25 RX ADMIN — VERAPAMIL HYDROCHLORIDE 240 MG: 240 TABLET, FILM COATED, EXTENDED RELEASE ORAL at 17:23

## 2018-01-25 RX ADMIN — KETOROLAC TROMETHAMINE 15 MG: 30 INJECTION, SOLUTION INTRAMUSCULAR at 05:08

## 2018-01-25 RX ADMIN — KETOROLAC TROMETHAMINE 15 MG: 30 INJECTION, SOLUTION INTRAMUSCULAR at 17:23

## 2018-01-25 RX ADMIN — ENOXAPARIN SODIUM 40 MG: 100 INJECTION SUBCUTANEOUS at 08:18

## 2018-01-25 RX ADMIN — ASPIRIN 81 MG: 81 TABLET, CHEWABLE ORAL at 08:18

## 2018-01-25 RX ADMIN — ACETAMINOPHEN 1000 MG: 500 TABLET ORAL at 11:44

## 2018-01-25 RX ADMIN — ACETAMINOPHEN 1000 MG: 500 TABLET ORAL at 00:24

## 2018-01-25 RX ADMIN — TAMSULOSIN HYDROCHLORIDE 0.4 MG: 0.4 CAPSULE ORAL at 08:18

## 2018-01-25 RX ADMIN — GEMFIBROZIL 600 MG: 600 TABLET ORAL at 17:23

## 2018-01-25 RX ADMIN — KETOROLAC TROMETHAMINE 15 MG: 30 INJECTION, SOLUTION INTRAMUSCULAR at 23:57

## 2018-01-25 ASSESSMENT — PAIN SCALES - GENERAL
PAINLEVEL_OUTOF10: 1
PAINLEVEL_OUTOF10: 0
PAINLEVEL_OUTOF10: 2
PAINLEVEL_OUTOF10: 3
PAINLEVEL_OUTOF10: 2
PAINLEVEL_OUTOF10: 0
PAINLEVEL_OUTOF10: 1
PAINLEVEL_OUTOF10: 1

## 2018-01-25 NOTE — CARE PLAN
Problem: Infection  Goal: Will remain free from infection    Intervention: Implement standard precautions and perform hand washing before and after patient contact  Freq Dressing changes R/T dressing saturation. Reduce risk of bacteria growth.

## 2018-01-25 NOTE — CARE PLAN
Problem: Respiratory:  Goal: Respiratory status will improve  Outcome: PROGRESSING AS EXPECTED  Patient placed on 2L to maintain O2 saturations >90%.  Encouraged use of IS.      Problem: Mobility  Goal: Risk for activity intolerance will decrease  Outcome: PROGRESSING AS EXPECTED  Patient up to chair with 1 assist for breakfast.

## 2018-01-25 NOTE — PROGRESS NOTES
"S:  77 y.o.male s/p Open Ileostomy Placement  POD# 2.  Patient doing well this morning, sitting up in chair eating breakfast.  Pain is minimal and controlled with tylenol, no N/V, ambulated yesterday in the hines, voids w/o difficulty.  Stoma output liquid with some coffee ground semi-solid.          O:  Blood pressure 113/57, pulse 68, temperature 36.6 °C (97.8 °F), resp. rate 18, height 1.778 m (5' 10\"), weight 87.5 kg (192 lb 14.4 oz), SpO2 95 %.  I/O last 3 completed shifts:  In: 3430 [P.O.:2030; I.V.:1400]  Out: 4375 [Urine:1575; Stool/Urine:2800]  Recent Labs      01/23/18   1311  01/24/18   0403  01/25/18   0233   SODIUM  134*  136  136   POTASSIUM  4.0  4.5  4.1   CHLORIDE  104  107  109   CO2  20  20  27   GLUCOSE  94  140*  120*   BUN  23*  28*  39*   CREATININE  1.19  1.10  1.00   CALCIUM  8.8  8.8  8.6     Recent Labs      01/23/18   1311  01/24/18   0403  01/25/18   0233   WBC  8.1  10.7  10.0   RBC  4.70  4.17*  3.74*   HEMOGLOBIN  15.6  14.0  12.3*   HEMATOCRIT  46.2  41.7*  36.9*   MCV  98.3*  100.0*  98.7*   MCH  33.2*  33.6*  32.9   MCHC  33.8  33.6*  33.3*   RDW  48.4  48.7  47.8   PLATELETCT  342  326  308   MPV  8.7*  8.7*  8.7*       Alert and Oriented x3, No Acute Distress  Normal Respiratory Effort  Abdomen soft, appropriately tender  Incisions/Bandages clean/dry/intact  Extremities warm and well perfused  Ostomy pink and healthy, output appears to be old blood, very dark liquid and coffee ground 2400ml  Void 1175 ml      A/P:  Pain control po and IV prn only  Diet will keep on full liquids at this point until stoma function confirmed  Fluids as ordered  Ambulate tid and ad cheryle  Encourage IS    "

## 2018-01-25 NOTE — PROGRESS NOTES
Received report from evening RN.  Assumed care of patient.  Patient A&Ox4.  No complaints of pain at this time.  No nausea or vomiting.  BLE dressings in place, CDI.  Patient stated dressings were changed yesterday and refusing RN to remove and assess. RLQ ostomy with moderate amounts of brown watery drainage.  Midline abdominal incision with dressing CDI.  ROSA, strength 4/4.  BLE numbness/tingling, baseline per patient.  Up with 1 assist.  Patient on room air with O2 saturations of 85%, placed on 2L.  Educated on IS and encouraged on its use.  Educated on mobilizing.  Plan of care discussed.  Call light within reach, bed in lowest position.

## 2018-01-25 NOTE — PROGRESS NOTES
Report received.  Assumed Care.  Pt in bed, 433 1. AOx4, responds appropriately.  Denies pain, SOB.  Plan of care discussed.  Explained importance of calling before getting OOB and pt verbalizes understanding.  Call light and belongings within reach, treaded slipper socks on, bed alarm in use, bed in lowest position.  Will monitor frequently.

## 2018-01-26 LAB
ANION GAP SERPL CALC-SCNC: 8 MMOL/L (ref 0–11.9)
BUN SERPL-MCNC: 31 MG/DL (ref 8–22)
CALCIUM SERPL-MCNC: 8.6 MG/DL (ref 8.5–10.5)
CHLORIDE SERPL-SCNC: 109 MMOL/L (ref 96–112)
CO2 SERPL-SCNC: 20 MMOL/L (ref 20–33)
CREAT SERPL-MCNC: 0.79 MG/DL (ref 0.5–1.4)
ERYTHROCYTE [DISTWIDTH] IN BLOOD BY AUTOMATED COUNT: 48.8 FL (ref 35.9–50)
GLUCOSE SERPL-MCNC: 119 MG/DL (ref 65–99)
HCT VFR BLD AUTO: 35.3 % (ref 42–52)
HGB BLD-MCNC: 11.4 G/DL (ref 14–18)
MCH RBC QN AUTO: 32.2 PG (ref 27–33)
MCHC RBC AUTO-ENTMCNC: 32.3 G/DL (ref 33.7–35.3)
MCV RBC AUTO: 99.7 FL (ref 81.4–97.8)
PLATELET # BLD AUTO: 300 K/UL (ref 164–446)
PMV BLD AUTO: 8.7 FL (ref 9–12.9)
POTASSIUM SERPL-SCNC: 4.2 MMOL/L (ref 3.6–5.5)
RBC # BLD AUTO: 3.54 M/UL (ref 4.7–6.1)
SODIUM SERPL-SCNC: 137 MMOL/L (ref 135–145)
WBC # BLD AUTO: 11 K/UL (ref 4.8–10.8)

## 2018-01-26 PROCEDURE — 700102 HCHG RX REV CODE 250 W/ 637 OVERRIDE(OP): Performed by: SURGERY

## 2018-01-26 PROCEDURE — 700111 HCHG RX REV CODE 636 W/ 250 OVERRIDE (IP): Performed by: SURGERY

## 2018-01-26 PROCEDURE — A9270 NON-COVERED ITEM OR SERVICE: HCPCS | Performed by: SURGERY

## 2018-01-26 PROCEDURE — 700101 HCHG RX REV CODE 250: Performed by: SURGERY

## 2018-01-26 PROCEDURE — 85027 COMPLETE CBC AUTOMATED: CPT

## 2018-01-26 PROCEDURE — 770006 HCHG ROOM/CARE - MED/SURG/GYN SEMI*

## 2018-01-26 PROCEDURE — 80048 BASIC METABOLIC PNL TOTAL CA: CPT

## 2018-01-26 PROCEDURE — 36415 COLL VENOUS BLD VENIPUNCTURE: CPT

## 2018-01-26 RX ADMIN — ENOXAPARIN SODIUM 40 MG: 100 INJECTION SUBCUTANEOUS at 08:54

## 2018-01-26 RX ADMIN — ONDANSETRON 4 MG: 2 INJECTION INTRAMUSCULAR; INTRAVENOUS at 20:09

## 2018-01-26 RX ADMIN — KETOROLAC TROMETHAMINE 15 MG: 30 INJECTION, SOLUTION INTRAMUSCULAR at 05:24

## 2018-01-26 RX ADMIN — ACETAMINOPHEN 1000 MG: 500 TABLET ORAL at 12:33

## 2018-01-26 RX ADMIN — ANTACID TABLETS 500 MG: 500 TABLET, CHEWABLE ORAL at 03:35

## 2018-01-26 RX ADMIN — VERAPAMIL HYDROCHLORIDE 240 MG: 240 TABLET, FILM COATED, EXTENDED RELEASE ORAL at 18:46

## 2018-01-26 RX ADMIN — TAMSULOSIN HYDROCHLORIDE 0.4 MG: 0.4 CAPSULE ORAL at 08:54

## 2018-01-26 RX ADMIN — ASPIRIN 81 MG: 81 TABLET, CHEWABLE ORAL at 08:54

## 2018-01-26 RX ADMIN — ACETAMINOPHEN 1000 MG: 500 TABLET ORAL at 05:24

## 2018-01-26 RX ADMIN — POTASSIUM CHLORIDE AND SODIUM CHLORIDE: 900; 150 INJECTION, SOLUTION INTRAVENOUS at 05:28

## 2018-01-26 RX ADMIN — KETOROLAC TROMETHAMINE 15 MG: 30 INJECTION, SOLUTION INTRAMUSCULAR at 12:34

## 2018-01-26 RX ADMIN — ANTACID TABLETS 500 MG: 500 TABLET, CHEWABLE ORAL at 01:42

## 2018-01-26 ASSESSMENT — PAIN SCALES - GENERAL
PAINLEVEL_OUTOF10: 2
PAINLEVEL_OUTOF10: 2
PAINLEVEL_OUTOF10: 0

## 2018-01-26 NOTE — WOUND TEAM
"[facility]  Inpatient Services  New Ostomy Management & Teaching    HPI:  Reviewed  PMH: Reviewed   SH: Reviewed    Subjective: \"I can do this\"    Objective: appliance leaking     Ostomy type: open ileostomy   Stoma location: RUQ   Stoma assessment:    Appearance:red, moist, bleeds easily, oval    Size: 1.8\"    Protrusion: ~1\"    Output: moderated green with blood clot    MC jxn intact    Peristomal skin: intact close to incision     Ostomy Appliance (type and size):two piece 2 1/4\" with paste ring    Interventions: This RN traced patter to barrier, patient cut barrier and placed paste ring. This RN removed soiled appliance and patient cleansed skin. This RN placed appliance and patient placed and closed pouch. Marked catalog. Reviewed crusting procedure.       Pt education: Questions and concerns addressed. He performed scant hands on, needs to  front of mirror.     Evaluation:Stoma viable, high output still. Patient started on solids today.      Plan: Ostomy nurses to continue to follow for ostomy needs and teaching.     Anticipated discharge needs: Supplies, possible HH   "

## 2018-01-26 NOTE — PROGRESS NOTES
"1/26  S:  77 y.o.male s/p Open Ileostomy Placement  POD# 3.  Patient doing very well, ambulating, tolerating PO, ileostomy functioning.  Wants to try solid PO.      O:  Blood pressure 136/76, pulse 71, temperature 36.8 °C (98.3 °F), resp. rate 16, height 1.778 m (5' 10\"), weight 87.5 kg (192 lb 14.4 oz), SpO2 97 %.  I/O last 3 completed shifts:  In: 3160 [P.O.:1560; I.V.:1600]  Out: 4475 [Urine:2125; Stool/Urine:2350]  Recent Labs      01/24/18 0403 01/25/18 0233  01/26/18 0418   SODIUM  136  136  137   POTASSIUM  4.5  4.1  4.2   CHLORIDE  107  109  109   CO2  20  27  20   GLUCOSE  140*  120*  119*   BUN  28*  39*  31*   CREATININE  1.10  1.00  0.79   CALCIUM  8.8  8.6  8.6     Recent Labs      01/24/18 0403  01/25/18 0233  01/26/18 0418   WBC  10.7  10.0  11.0*   RBC  4.17*  3.74*  3.54*   HEMOGLOBIN  14.0  12.3*  11.4*   HEMATOCRIT  41.7*  36.9*  35.3*   MCV  100.0*  98.7*  99.7*   MCH  33.6*  32.9  32.2   MCHC  33.6*  33.3*  32.3*   RDW  48.7  47.8  48.8   PLATELETCT  326  308  300   MPV  8.7*  8.7*  8.7*       Alert and Oriented x3, No Acute Distress  Normal Respiratory Effort  Abdomen soft, appropriately tender  Incisions/Bandages clean/dry/intact  Extremities warm and well perfused  Ostomy pink and healthy, output semiformed stool, some dark output as well, possible bleeding from ileostomy site  Urine- 1300cc  Ostomy- 1550    A/P:  Pain control with PO meds  Diet as tolerated  Fluids SLIV  Ambulate tid and ad cheryle  Ostomy output still very high, will observe for now, urin output good though so not dehydration.  Advance to solids, discussed small frequent meals with patient, discharge home when output and PO intake stable.      "

## 2018-01-26 NOTE — CARE PLAN
Problem: Skin Integrity  Goal: Risk for impaired skin integrity will decrease    Intervention: Implement precautions to protect skin integrity in collaboration with the interdisciplinary team  Educated pt to call for help mobilizing. Pt compliant       Problem: Respiratory:  Goal: Respiratory status will improve    Intervention: Educate and encourage incentive spirometry usage  Coached pt to use IS. Pt performs while in room. Admits not using when not provoked. Pt educated of benefits. Pt will understand the use and rational of the is before end of shift

## 2018-01-27 VITALS
WEIGHT: 189.15 LBS | OXYGEN SATURATION: 93 % | BODY MASS INDEX: 27.08 KG/M2 | SYSTOLIC BLOOD PRESSURE: 139 MMHG | DIASTOLIC BLOOD PRESSURE: 84 MMHG | RESPIRATION RATE: 17 BRPM | TEMPERATURE: 98 F | HEIGHT: 70 IN | HEART RATE: 78 BPM

## 2018-01-27 LAB
ANION GAP SERPL CALC-SCNC: 6 MMOL/L (ref 0–11.9)
BASOPHILS # BLD AUTO: 0.8 % (ref 0–1.8)
BASOPHILS # BLD: 0.09 K/UL (ref 0–0.12)
BUN SERPL-MCNC: 27 MG/DL (ref 8–22)
CALCIUM SERPL-MCNC: 9 MG/DL (ref 8.5–10.5)
CHLORIDE SERPL-SCNC: 106 MMOL/L (ref 96–112)
CO2 SERPL-SCNC: 23 MMOL/L (ref 20–33)
CREAT SERPL-MCNC: 0.83 MG/DL (ref 0.5–1.4)
EOSINOPHIL # BLD AUTO: 0.25 K/UL (ref 0–0.51)
EOSINOPHIL NFR BLD: 2.1 % (ref 0–6.9)
ERYTHROCYTE [DISTWIDTH] IN BLOOD BY AUTOMATED COUNT: 47.9 FL (ref 35.9–50)
GLUCOSE SERPL-MCNC: 131 MG/DL (ref 65–99)
HCT VFR BLD AUTO: 34.4 % (ref 42–52)
HGB BLD-MCNC: 11.6 G/DL (ref 14–18)
IMM GRANULOCYTES # BLD AUTO: 0.13 K/UL (ref 0–0.11)
IMM GRANULOCYTES NFR BLD AUTO: 1.1 % (ref 0–0.9)
LYMPHOCYTES # BLD AUTO: 1.8 K/UL (ref 1–4.8)
LYMPHOCYTES NFR BLD: 15.1 % (ref 22–41)
MCH RBC QN AUTO: 33.5 PG (ref 27–33)
MCHC RBC AUTO-ENTMCNC: 33.7 G/DL (ref 33.7–35.3)
MCV RBC AUTO: 99.4 FL (ref 81.4–97.8)
MONOCYTES # BLD AUTO: 0.71 K/UL (ref 0–0.85)
MONOCYTES NFR BLD AUTO: 6 % (ref 0–13.4)
NEUTROPHILS # BLD AUTO: 8.92 K/UL (ref 1.82–7.42)
NEUTROPHILS NFR BLD: 74.9 % (ref 44–72)
NRBC # BLD AUTO: 0 K/UL
NRBC BLD-RTO: 0 /100 WBC
PLATELET # BLD AUTO: 318 K/UL (ref 164–446)
PMV BLD AUTO: 8.9 FL (ref 9–12.9)
POTASSIUM SERPL-SCNC: 4.5 MMOL/L (ref 3.6–5.5)
RBC # BLD AUTO: 3.46 M/UL (ref 4.7–6.1)
SODIUM SERPL-SCNC: 135 MMOL/L (ref 135–145)
WBC # BLD AUTO: 11.9 K/UL (ref 4.8–10.8)

## 2018-01-27 PROCEDURE — 700111 HCHG RX REV CODE 636 W/ 250 OVERRIDE (IP): Performed by: SURGERY

## 2018-01-27 PROCEDURE — 85025 COMPLETE CBC W/AUTO DIFF WBC: CPT

## 2018-01-27 PROCEDURE — 36415 COLL VENOUS BLD VENIPUNCTURE: CPT

## 2018-01-27 PROCEDURE — A9270 NON-COVERED ITEM OR SERVICE: HCPCS | Performed by: SURGERY

## 2018-01-27 PROCEDURE — 700102 HCHG RX REV CODE 250 W/ 637 OVERRIDE(OP): Performed by: SURGERY

## 2018-01-27 PROCEDURE — 80048 BASIC METABOLIC PNL TOTAL CA: CPT

## 2018-01-27 RX ORDER — LOPERAMIDE HYDROCHLORIDE 2 MG/1
2 CAPSULE ORAL 4 TIMES DAILY PRN
Qty: 30 CAP | Refills: 6 | Status: SHIPPED | OUTPATIENT
Start: 2018-01-27

## 2018-01-27 RX ADMIN — ANTACID TABLETS 500 MG: 500 TABLET, CHEWABLE ORAL at 02:08

## 2018-01-27 RX ADMIN — TAMSULOSIN HYDROCHLORIDE 0.4 MG: 0.4 CAPSULE ORAL at 09:01

## 2018-01-27 RX ADMIN — ENOXAPARIN SODIUM 40 MG: 100 INJECTION SUBCUTANEOUS at 09:01

## 2018-01-27 RX ADMIN — ACETAMINOPHEN 1000 MG: 500 TABLET ORAL at 05:39

## 2018-01-27 RX ADMIN — ASPIRIN 81 MG: 81 TABLET, CHEWABLE ORAL at 09:01

## 2018-01-27 ASSESSMENT — PAIN SCALES - GENERAL: PAINLEVEL_OUTOF10: 3

## 2018-01-27 NOTE — PROGRESS NOTES
"Patient had profuse bleeding from stoma this afternoon.  MD paged and 2 stitches were placed to stop bleeding after pressure held for approx 45 min.  Appliance replaced and has held since then. Patient's vitals were stable during episode, however, he now states that he feels general \"malaise\" and some nausea which eventually had an emesis, which he did not save.  Oncoming RN aware of situation.  1800 meds held at this time.   "

## 2018-01-27 NOTE — PROGRESS NOTES
"1/27  S:  77 y.o.male s/p Open Ileostomy  POD# 4.  Patient doing well, ambulating, tolerating PO, ileostomy functioning.  Had some bleeding form stoma site overnight, stiches placed x2 with hemostasis.  Ever since he has continued to have ileostomy output without any further evidence of bleeding.      O:  Blood pressure 139/84, pulse 78, temperature 36.7 °C (98 °F), resp. rate 17, height 1.778 m (5' 10\"), weight 85.8 kg (189 lb 2.5 oz), SpO2 93 %.  I/O last 3 completed shifts:  In: 2040 [P.O.:1040; I.V.:1000]  Out: 2360 [Urine:1160; Stool/Urine:1200]  Recent Labs      01/25/18 0233  01/26/18 0418  01/27/18   0327   SODIUM  136  137  135   POTASSIUM  4.1  4.2  4.5   CHLORIDE  109  109  106   CO2  27  20  23   GLUCOSE  120*  119*  131*   BUN  39*  31*  27*   CREATININE  1.00  0.79  0.83   CALCIUM  8.6  8.6  9.0     Recent Labs      01/25/18   0233  01/26/18   0418  01/27/18   0327   WBC  10.0  11.0*  11.9*   RBC  3.74*  3.54*  3.46*   HEMOGLOBIN  12.3*  11.4*  11.6*   HEMATOCRIT  36.9*  35.3*  34.4*   MCV  98.7*  99.7*  99.4*   MCH  32.9  32.2  33.5*   MCHC  33.3*  32.3*  33.7   RDW  47.8  48.8  47.9   PLATELETCT  308  300  318   MPV  8.7*  8.7*  8.9*       Alert and Oriented x3, No Acute Distress  Normal Respiratory Effort  Abdomen soft, appropriately tender  Incisions/Bandages clean/dry/intact  Extremities warm and well perfused  Ostomy pink and healthy, output green liquid and semiformed stool  Urine- 660  Stool- 500    A/P:  Pain control with PO meds  Diet as tolerated  Fluids SLIV  Ambulate tid and ad cheryle  Wants to go home today, no bleeding since sutures placed.  He is ambulating, tolerating PO and ostomy output is reasonable.  He denies any nausea this am and is tolerating a general diet.  If he continues to tolerate meals and does not have any further bleeding, ok for home this pm.      "

## 2018-01-27 NOTE — PROGRESS NOTES
Patient discharged via wc and escort.  Discharge instructions, meds, and appts reviewed with pt.  Unable to schedule appt with outpatient wound care but left a VM for them and gave pt their number.  Patient was very anxious to leave hosp this afternoon.

## 2018-01-27 NOTE — PROGRESS NOTES
Pt aox4. No visible signs of distress. VSS. Tolerating medication regimen without any signs or symptoms of adverse reactions. Pt declined analgesics this shift. Pt reported nausea and vomiting at beginning of shift, medicated per MAR. No complaints of SOB. Call light within reach and able to make all needs be known. Will continue to monitor.

## 2018-01-27 NOTE — DISCHARGE INSTRUCTIONS
Discharge Instructions    Discharged to home by car with friend. Discharged via walking, hospital escort: Yes.  Special equipment needed: Not Applicable    Be sure to schedule a follow-up appointment with your primary care doctor or any specialists as instructed.     Discharge Plan:   Diet Plan: Discussed  Activity Level: Discussed  Confirmed Follow up Appointment: Patient to Call and Schedule Appointment  Confirmed Symptoms Management: Discussed  Medication Reconciliation Updated: Yes  Influenza Vaccine Indication: Not indicated: Previously immunized this influenza season and > 8 years of age    I understand that a diet low in cholesterol, fat, and sodium is recommended for good health. Unless I have been given specific instructions below for another diet, I accept this instruction as my diet prescription.   Other diet: Regular      Special Instructions: None    Is patient discharged on Warfarin / Coumadin?   No     Depression / Suicide Risk    As you are discharged from this [facility] facility, it is important to learn how to keep safe from harming yourself.    Recognize the warning signs:  Abrupt changes in personality, positive or negative- including increase in energy   Giving away possessions  Change in eating patterns- significant weight changes-  positive or negative  Change in sleeping patterns- unable to sleep or sleeping all the time   Unwillingness or inability to communicate  Depression  Unusual sadness, discouragement and loneliness  Talk of wanting to die  Neglect of personal appearance   Rebelliousness- reckless behavior  Withdrawal from people/activities they love  Confusion- inability to concentrate     If you or a loved one observes any of these behaviors or has concerns about self-harm, here's what you can do:  Talk about it- your feelings and reasons for harming yourself  Remove any means that you might use to hurt yourself (examples: pills, rope, extension cords, firearm)  Get professional  help from the community (Mental Health, Substance Abuse, psychological counseling)  Do not be alone:Call your Safe Contact- someone whom you trust who will be there for you.  Call your local CRISIS HOTLINE 527-9884 or 114-883-8793  Call your local Children's Mobile Crisis Response Team [place] (454) 245-0501 or www.[employer]  Call the toll free National Suicide Prevention Hotlines   National Suicide Prevention Lifeline 963-109-QMHA (1675)  [place] Hope Line Network 800-SUICIDE (707-9333)    Ileostomy Surgery, Care After  Refer to this sheet in the next few weeks. These instructions provide you with information on caring for yourself after your procedure. Your caregiver may also give you more specific instructions. Your treatment has been planned according to current medical practices, but problems sometimes occur. Call your caregiver if you have any problems or questions after your procedure.  HOME CARE INSTRUCTIONS  · Rest. Give yourself time to adjust to having the external pouch if you have one. Give your surgical cuts (incisions) and new pouch time to heal.  · Avoid strenuous activity, heavy lifting, and abdominal exercises for up to 10 weeks. After that, check with your caregiver.  · Take pain medicine or other medicines as directed. Your caregiver may recommend certain medicines for the relief of constipation or diarrhea.  · You may shower with or without the pouch.  · Wear any clothing that is comfortable.  · Follow your caregiver's instructions to protect the skin around the stoma.  · Change your pouch as often as needed. It may need to be changed more often right after surgery.  · Follow your caregiver's dietary instructions. Your caregiver will help you understand which foods may cause blockage, increase bowel movements, slow bowel movements, cause skin irritation, or cause gas.  · You may gradually resume most activities, including sexual activity, as directed. Ask your caregiver about  becoming pregnant and about using birth control. Medicines may not be absorbed normally after your procedure.  · Always discuss your medicines with your caregiver before taking them.  · You may travel. Be sure to pack plenty of ileostomy supplies.  · If you smoke, quit. Smoking slows the healing process.  SEEK MEDICAL CARE IF:  · You are having trouble caring for your stoma or using the ostomy supplies (changing the pouch).  · You feel nauseous.  · You vomit.  · You notice bleeding, skin irritation, drainage, bulging, redness of the wounds, or pain around the anus or stoma.  · You notice a change in the size or appearance of the stoma.  · You have abdominal pain, bloating, pressure, or cramping.  · Your stools do not become firmer.  · Your stool frequency is more or less often than expected.  · You experience sexual dysfunction.  · You experience shortness of breath, fatigue, thirst, dry mouth, or unusual sensations in the limbs.  · You have other new symptoms.  · You have questions or concerns.  SEEK IMMEDIATE MEDICAL CARE IF:  · Your abdominal pain does not go away or it becomes severe.  · You feel dizzy, lightheaded, or faint.  · You measure pouch drainage of more than 1,500 ml per day. This amount of drainage can lead to dehydration.  · You have a fever.  · You keep vomiting.  · Your stool is not draining through the stoma.  · You have an irregular heartbeat or chest pain.  MAKE SURE YOU:  · Understand these instructions.  · Will watch your condition.  · Will get help right away if you are not doing well or get worse.     This information is not intended to replace advice given to you by your health care provider. Make sure you discuss any questions you have with your health care provider.     Document Released: 07/30/2012 Document Revised: 12/23/2014 Document Reviewed: 07/30/2012  [employer] Interactive Patient Education ©2016 [employer]  Ileostomy Home Guide  An ileostomy is an opening for stool to leave your  body when a medical condition prevents it from leaving through the usual opening (rectum). During a surgery, a piece of small intestine (ileum) is brought through a hole in the abdominal wall. The new opening is called a stoma or ostomy. A bag or pouch fits over the stoma to catch stool and gas. Your stool may be liquid at first. Over time, it may become about the consistency of applesauce.  CARING FOR YOUR STOMA  Normally, the stoma looks a lot like the inside of your cheek: pink and moist. At first, it may be swollen, but this swelling will decrease within 6 weeks.  Keep the skin around the stoma clean and dry. You can gently wash your stoma in the shower with a clean, soft washcloth. If you develop any skin irritation, your caregiver may give you a stoma powder or ointment to help heal the area. Do not use any products other than those specifically given to you by your caregiver.   Your stoma should not be uncomfortable. If you notice any stinging or burning, your pouch may be leaking, and the skin around your stoma may be coming into contact with stool. This can cause skin irritation. If you notice stinging, you should replace your pouch with a new one and discard the old one.  OSTOMY POUCHES  The pouch that fits over the ostomy can be made up of either 1 or 2 pieces. A one-piece pouch has a skin barrier piece and the pouch itself in one unit. A two-piece pouch has a skin barrier with a separate pouch that snaps on and off of the skin barrier. Either way, you should empty the pouch when it is only  to ½ full. Do not let more stool or gas build up. This could cause the pouch to leak.  Some ostomy bags have a built-in gas release valve. Ostomy deodorizer (5 drops) can be put into the pouch to prevent odor. Some people use an ostomy lubricant to help the stool slide out of the bag more easily and completely.   EMPTYING YOUR OSTOMY POUCH  You may get lessons on how to empty your pouch from a wound-ostomy nurse  before you leave the hospital. Here are the basic steps:  · Wash your hands with soap and water.  · Sit far back on the toilet.  · Put pieces of toilet paper into the toilet water. This will prevent splashing as you empty the stool into the toilet bowl.  · Unclip or unvelcro the tail end of the pouch.  · Unroll the tail and empty stool into the toilet.  · Clean the tail with toilet paper.  · Reroll the tail, and clip or velcro it closed.  · Wash your hands again.  CHANGING YOUR OSTOMY POUCH  Change your ostomy pouch about every 3 to 4 days for the first 6 weeks, then every 5 to 7 days. Always change the bag sooner if you begin to notice any discomfort or irritation of the skin around the stoma. When possible, plan to change your ostomy pouching system before eating and drinking because this will lessen the chance of stool coming out during the change. A wound-ostomy nurse may teach you how to change your pouch before you leave the hospital. Here are the basic steps:  · Lay out your supplies.  · Wash your hands with soap and water.  · Carefully remove the old pouch.  · Wash the stoma and the skin around the stoma. Allow the area to dry. Men may be advised to shave any hair around the stoma very carefully. This will make the adhesive stick better.  · Use the stoma measuring guide that comes with your pouch set to decide what size hole you will need to cut in the skin barrier piece. Choose the smallest possible size that will hold the stoma but will not touch it.  · Use the guide to trace the [place] on the back of the skin barrier piece. Cut out the hole.  · Hold the skin barrier piece over the stoma to make sure the hole is the correct size.  · Remove the adhesive paper backing from the skin barrier piece.  · Squeeze stoma paste around the opening of the skin barrier piece.  · Clean and dry the skin around the stoma again.  · Carefully fit the skin barrier piece over your stoma.  · If you are using a two-piece pouch,  snap the pouch onto the skin barrier piece.  · Close the tail of the pouch.  · Put your hand over the top of the skin barrier piece to help warm it for about 5 minutes, so that it conforms to your body better.  · Wash your hands again.  DIET TIPS  Because you have a higher risk of a blockage in the first 2 months after getting an ileostomy, you should decrease your fiber intake for that time period. Fibrous foods include:  · Celery.  · Cabbage (and coleslaw).  · Pineapple.  · Mushrooms.  · Corn and popcorn.  · Whole fruits and vegetables, especially with the skins on.  · Foods that contain seeds.  · Oranges, grapefruit, tangerines, and other citrus fruit.  · Nuts.   After about 2 months, you can slowly add these kinds of foods back into your regular diet, as tolerated.   Other tips:  · Drink about eight, 8 oz glasses of water each day.  · You can prevent gas by eating slowly and chewing your food thoroughly.  · If you feel concerned that you have too much gas, you can cut back on gas-producing foods, such as:  ¨ Spicy foods.  ¨ Onions and garlic.  ¨ Cruciferous vegetables (cabbage, broccoli, cauliflower, [place] sprouts).  ¨ Beans and legumes.  ¨ Some cheeses.  ¨ Eggs.  ¨ Fish.  ¨ Bubbly (carbonated) drinks.  ¨ Chewing gum.  GENERAL TIPS  · You can shower with or without the bag in place.  · Always keep the bag snapped on if you are bathing or swimming.  · If your bag gets wet, you can dry it with a blow-dryer set to cool.  · Avoid wearing tight clothing directly over your stoma so that it does not become irritated or bleed. Tight clothing can also prevent the stool from draining into the pouch, which can cause it to leak.  · It is helpful to always have an extra skin barrier and pouch with you when traveling. Do not leave them anywhere too warm, because parts of them can melt.  · Do not let your seat belt rest on your stoma. Try to keep the seat belt either above or below your stoma, or use a tiny pillow to  cushion it.  · You can still participate in sports, but you should avoid activities in which there is a risk of getting hit in the abdomen.  · You can still have sex. It is a good idea to empty your pouch prior to sex. Some people and their partners feel very comfortable seeing the pouch during sex. Others choose to wear lingerie or a T-shirt that covers the device.  SEEK IMMEDIATE MEDICAL CARE IF:  · You feel dizzy, light-headed, or faint.  · You measure pouch drainage of more than 1,500 mL per day. This amount of drainage can lead to dehydration.  · You notice a change in the size or color of the stoma, especially if it becomes very red, purple, black, or pale white.  · You have bloody stools or bleeding from the ostomy.  · You have abdominal pain, nausea, vomiting, or bloating.  · There is anything unusual protruding from the ostomy.  · You have irritation or red skin around the ostomy.  · No stool is passing from the stoma.  · You have diarrhea (requiring pouch emptying more frequently than normal).     This information is not intended to replace advice given to you by your health care provider. Make sure you discuss any questions you have with your health care provider.     Document Released: 12/20/2004 Document Revised: 01/08/2016 Document Reviewed: 05/16/2012  [employer] Interactive Patient Education ©2016 [employer]

## 2018-01-28 NOTE — DISCHARGE PLANNING
This PM, RN asked SW twice how to set pt up with op wound clinic and pt wants to d/c. SW replied to RN both times that Hospital Schedulers set up op wound clinic and that SW already spoke with the Hospital  and Hospital  is aware and will work on pt.

## 2018-01-31 ENCOUNTER — PATIENT OUTREACH (OUTPATIENT)
Dept: HEALTH INFORMATION MANAGEMENT | Facility: OTHER | Age: 78
End: 2018-01-31

## 2018-01-31 DIAGNOSIS — E78.1 HYPERTRIGLYCERIDEMIA: Chronic | ICD-10-CM

## 2018-01-31 RX ORDER — GEMFIBROZIL 600 MG/1
TABLET, FILM COATED ORAL
Qty: 90 TAB | Refills: 0 | Status: SHIPPED | OUTPATIENT
Start: 2018-01-31

## 2018-01-31 NOTE — PROGRESS NOTES
01/31/2018 0942 - Discharge Outreach - received call from patient. States he was discharged on 1/27 following ileostomy. States he was supposed to be given 5 ostomy bags at discharge, but did not. States he is down to 1 bag and not sure what to do. Transferred to Ostomy clinic.

## 2018-02-09 ENCOUNTER — TELEPHONE (OUTPATIENT)
Dept: MEDICAL GROUP | Facility: MEDICAL CENTER | Age: 78
End: 2018-02-09

## 2018-02-09 NOTE — TELEPHONE ENCOUNTER
ESTABLISHED PATIENT PRE-VISIT PLANNING     Note: Patient will not be contacted if there is no indication to call.     1.  Reviewed notes from the last few office visits within the medical group: Yes    2.  If any orders were placed at last visit or intended to be done for this visit (i.e. 6 mos follow-up), do we have Results/Consult Notes?        •  Labs - Labs ordered, completed on 01/27/2018 and results are in chart.   Note: If patient appointment is for lab review and patient did not complete labs, check with provider if OK to reschedule patient until labs completed.       •  Imaging - Imaging was not ordered at last office visit.       •  Referrals - No referrals were ordered at last office visit.    3. Is this appointment scheduled as a Hospital Follow-Up? No    4.  Immunizations were updated in Epic using WebIZ?: Yes       •  Web Iz Recommendations: Patient is up to date on all vaccines    5.  Patient is due for the following Health Maintenance Topics:   There are no preventive care reminders to display for this patient.       6.  Patient was NOT informed to arrive 15 min prior to their scheduled appointment and bring in their medication bottles.

## 2018-02-13 NOTE — DISCHARGE SUMMARY
Discharge Summary      DATE OF ADMISSION: 1/23/2018    DATE OF DISCHARGE: 1/27/18    ADMISSION DIAGNOSIS (ES):  ? Fecal Incontinence  ? Hx of Ulcerative Colitis with J-pouch placement    DISCHARGE DIAGNOSIS (ES):  ? Same  ? Ileostomy    DISCHARGE CONDITION:  ? stable    CONSULTATIONS:  ? none    PROCEDURES:  ? 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  He has begun having incontinence and has problems with hemorrhoids and anal stricture.  These were surgically treated and have healed, but he still has incontinence issues along with diarrhea.  We discussed alternatives therapies for his issues as he no longer has a stricture present, but patient would like to have an ileostomy placed again as he functioned quite well with this in the remote past when his J pouch was first placed and this would be a definitive treatment for his incontinence; therefore, we decided to proceed with ileostomy placement.  Risks, benefits, alternatives of surgery explained to the patient before proceeding.    HOSPITAL COURSE:  ? After undergoing the above procedure, the patient recovered in house without complication.  He was able to ambulate, tolerate PO and had ileostomy output.  He had a hx of prior stoma and so was easily able to learn ostomy care.  He did have some bleeding from the stoma edge  Which was controlled with sutures post op.    ? Eventually, the pt was able to be discharged home after tolerating a diet, ambulating and pain was controlled.  At the time of discharge home his wound was healing well and the ostomy appeared healthy.  He did not have any further bleeding and was discharged home in stable condition.      MEDS:   Current Outpatient Prescriptions   Medication Sig Dispense Refill   • loperamide (IMODIUM) 2 MG Cap Take 1 Cap by mouth 4 times a day as needed for Diarrhea. 30 Cap 6   • gemfibrozil (LOPID) 600 MG Tab  TAKE 1 TAB BY MOUTH EVERY DAY. 90 Tab 0   • tamsulosin (FLOMAX) 0.4 MG capsule TAKE 1 CAP BY MOUTH ONE-HALF HOUR AFTER BREAKFAST. 90 Cap 1   • folic acid (FOLVITE) 1 MG Tab TAKE 1 TABLET BY MOUTH EVERY DAY 90 Tab 0   • allopurinol (ZYLOPRIM) 300 MG Tab TAKE 1 TAB BY MOUTH EVERY DAY. 90 Tab 1   • vitamin D (CHOLECALCIFEROL) 1000 UNIT Tab Take 3,000 Units by mouth every day.     • Glucosamine HCl (GLUCOSAMINE PO) Take 1 Tab by mouth every day.     • verapamil ER (CALAN-SR) 240 MG Tab CR Take 1 Tab by mouth every day. TAKE 1 TAB BY MOUTH EVERY DAY. 180 Tab 3   • cyanocobalamin (VITAMIN B12) 1000 MCG Tab Take 1 Tab by mouth every day. 90 Tab 3   • aspirin 81 MG tablet Take 81 mg by mouth every day.         FOLLOW-UP:  ? Please call my office at [phone] to make an appointment in 1 weeks    DISCHARGE INSTRUCTIONS:

## 2018-02-20 RX ORDER — FOLIC ACID 1 MG/1
TABLET ORAL
Qty: 90 TAB | Refills: 0 | Status: SHIPPED | OUTPATIENT
Start: 2018-02-20

## 2025-01-07 NOTE — TELEPHONE ENCOUNTER
Paperwork completed and faxed over to [facility] ([phone])    Confirmation received.    Was the patient seen in the last year in this department? Yes     Does patient have an active prescription for medications requested? Yes     Received Request Via: Pharmacy

## (undated) DEVICE — GOWN SURGEONS X-LARGE - DISP. (30/CA)

## (undated) DEVICE — SUTURE 1 PDS II PLUS TP-1 - (12PK/BX)

## (undated) DEVICE — TUBE CONNECT SUCTION CLEAR 120 X 1/4" (50EA/CA)"

## (undated) DEVICE — HEAD HOLDER JUNIOR/ADULT

## (undated) DEVICE — SCISSORS 5MM CVD (6EA/BX)

## (undated) DEVICE — SUTURE 3-0 CHROMIC GUT SH 27 (36PK/BX)"

## (undated) DEVICE — TUBE E-T HI-LO CUFF 7.5MM (10EA/PK)

## (undated) DEVICE — GLOVE BIOGEL INDICATOR SZ 7SURGICAL PF LTX - (50/BX 4BX/CA)

## (undated) DEVICE — TRAY SRGPRP PVP IOD WT PRP - (20/CA)

## (undated) DEVICE — STAPLER SKIN DISP - (6/BX 10BX/CA) VISISTAT

## (undated) DEVICE — GOWN WARMING STANDARD FLEX - (30/CA)

## (undated) DEVICE — KIT ANESTHESIA W/CIRCUIT & 3/LT BAG W/FILTER (20EA/CA)

## (undated) DEVICE — SYRINGE 10 ML CONTROL LL (25EA/BX 4BX/CA)

## (undated) DEVICE — BLADE SURGICAL #10 - (50/BX)

## (undated) DEVICE — SET EXTENSION WITH 2 PORTS (48EA/CA) ***PART #2C8610 IS A SUBSTITUTE*****

## (undated) DEVICE — GRAFT MESH SEPRAFILM PRO PACK - 5/BX CONTAINS 6 3X5 PIECES

## (undated) DEVICE — SLEEVE, VASO, THIGH, MED

## (undated) DEVICE — DERMABOND ADVANCED - (12EA/BX)

## (undated) DEVICE — TRAY CATHETER FOLEY URINE METER W/STATLOCK 350ML (10EA/CA)

## (undated) DEVICE — LEGGING LITHOTOMY 31 X 48 IN - (2EA/PK 20PK/CA)

## (undated) DEVICE — PROTECTOR ULNA NERVE - (36PR/CA)

## (undated) DEVICE — SENSOR SPO2 NEO LNCS ADHESIVE (20/BX) SEE USER NOTES

## (undated) DEVICE — PLUMEPEN ULTRA 3/8 IN X 10 FT HOSE (20EA/CA)

## (undated) DEVICE — DRAPE MAYO STAND - (30/CA)

## (undated) DEVICE — BLOCK

## (undated) DEVICE — SPONGE GAUZESTER 4 X 4 4PLY - (128PK/CA)

## (undated) DEVICE — GLOVE BIOGEL SZ 7 SURGICAL PF LTX - (50PR/BX 4BX/CA)

## (undated) DEVICE — ELECTRODE DUAL RETURN W/ CORD - (50/PK)

## (undated) DEVICE — TROCAR 5X100 NON BLADED Z-TH - READ KII (6/BX)

## (undated) DEVICE — PAD LAP STERILE 18 X 18 - (5/PK 40PK/CA)

## (undated) DEVICE — MASK AIRWAY FLEXIBLE SINGLE-USE SIZE 5 ADULTS (10EA/BX)

## (undated) DEVICE — ELECTRODE 850 FOAM ADHESIVE - HYDROGEL RADIOTRNSPRNT (50/PK)

## (undated) DEVICE — LACTATED RINGERS INJ 1000 ML - (14EA/CA 60CA/PF)

## (undated) DEVICE — HEMOSTAT SURG ABSORBABLE - 4 X 8 IN SURGICEL (24EA/CA)

## (undated) DEVICE — DRAPE UNDER BUTTOCKS FLUID - (20/CA)

## (undated) DEVICE — CANNULA W/SEAL 5X100 Z-THRE - ADED KII (12/BX)

## (undated) DEVICE — SET LEADWIRE 5 LEAD BEDSIDE DISPOSABLE ECG (1SET OF 5/EA)

## (undated) DEVICE — SUCTION INSTRUMENT YANKAUER BULBOUS TIP W/O VENT (50EA/CA)

## (undated) DEVICE — SUTURE 0 VICRYL PLUS UR-6 - 27 INCH (36/BX)

## (undated) DEVICE — LIGASURE TISSUE FUSION  - SINGLE USE (6/CA)

## (undated) DEVICE — KIT ROOM DECONTAMINATION

## (undated) DEVICE — CANISTER SUCTION 3000ML MECHANICAL FILTER AUTO SHUTOFF MEDI-VAC NONSTERILE LF DISP  (40EA/CA)

## (undated) DEVICE — SUTURE GENERAL

## (undated) DEVICE — SPONGE GAUZE STER 4X4 8-PL - (2/PK 50PK/BX 12BX/CS)

## (undated) DEVICE — PACK LAP CHOLE OR - (2EA/CA)

## (undated) DEVICE — SUTURE 3-0 VICRYL PLUS SH - 8X 18 INCH (12/BX)

## (undated) DEVICE — CATHETER FOLEY ROBINSON 16FR 16IN STRL (12EA/CA)

## (undated) DEVICE — BRIEF STRETCH MATERNITY M/L - FITS 20-60IN (5EA/BG 20BG/CA)

## (undated) DEVICE — TUBING CLEARLINK DUO-VENT - C-FLO (48EA/CA)

## (undated) DEVICE — SUTURE 0 COATED VICRYL 6-18IN - (12PK/BX)

## (undated) DEVICE — DRAPE LARGE 3 QUARTER - (20/CA)

## (undated) DEVICE — SUTURE 4-0 MONOCRYL PLUS PS-2 - 27 INCH (36/BX)

## (undated) DEVICE — GLOVE BIOGEL PI INDICATOR SZ 7.0 SURGICAL PF LF - (50/BX 4BX/CA)

## (undated) DEVICE — CHLORAPREP 26 ML APPLICATOR - ORANGE TINT(25/CA)

## (undated) DEVICE — NEPTUNE 4 PORT MANIFOLD - (20/PK)

## (undated) DEVICE — JELLY, KY 2 0Z STERILE

## (undated) DEVICE — MASK ANESTHESIA ADULT  - (100/CA)

## (undated) DEVICE — PACK MINOR BASIN - (2EA/CA)

## (undated) DEVICE — TUBING INSUFFLATION - (10/BX)

## (undated) DEVICE — SUCTION TIP STRAIGHT ARGYLE - 50EA/CA

## (undated) DEVICE — SODIUM CHL IRRIGATION 0.9% 1000ML (12EA/CA)

## (undated) DEVICE — BAG, SPONGE COUNT 50600

## (undated) DEVICE — KIT 2.25IN COL ILSTM 2 PC DRN - 57MM 2 1/4 INCH THIS IS FOR THE OR ONLY (5/BX)